# Patient Record
Sex: FEMALE | Race: WHITE | Employment: FULL TIME | URBAN - METROPOLITAN AREA
[De-identification: names, ages, dates, MRNs, and addresses within clinical notes are randomized per-mention and may not be internally consistent; named-entity substitution may affect disease eponyms.]

---

## 2018-02-13 PROBLEM — M54.42 CHRONIC BILATERAL LOW BACK PAIN WITH BILATERAL SCIATICA: Status: ACTIVE | Noted: 2018-02-13

## 2018-02-13 PROBLEM — M54.41 CHRONIC BILATERAL LOW BACK PAIN WITH BILATERAL SCIATICA: Status: ACTIVE | Noted: 2018-02-13

## 2018-02-13 PROBLEM — M54.2 NECK PAIN: Status: ACTIVE | Noted: 2018-02-13

## 2018-02-13 PROBLEM — M51.26 HNP (HERNIATED NUCLEUS PULPOSUS), LUMBAR: Status: ACTIVE | Noted: 2018-02-13

## 2018-02-13 PROBLEM — G89.29 CHRONIC BILATERAL LOW BACK PAIN WITH BILATERAL SCIATICA: Status: ACTIVE | Noted: 2018-02-13

## 2019-05-18 ENCOUNTER — APPOINTMENT (OUTPATIENT)
Dept: CT IMAGING | Age: 39
End: 2019-05-18
Attending: EMERGENCY MEDICINE
Payer: COMMERCIAL

## 2019-05-18 ENCOUNTER — HOSPITAL ENCOUNTER (EMERGENCY)
Age: 39
Discharge: HOME | End: 2019-05-18
Attending: EMERGENCY MEDICINE
Payer: COMMERCIAL

## 2019-05-18 VITALS
DIASTOLIC BLOOD PRESSURE: 79 MMHG | RESPIRATION RATE: 12 BRPM | OXYGEN SATURATION: 99 % | HEIGHT: 68 IN | WEIGHT: 204 LBS | BODY MASS INDEX: 30.92 KG/M2 | TEMPERATURE: 98 F | SYSTOLIC BLOOD PRESSURE: 124 MMHG | HEART RATE: 79 BPM

## 2019-05-18 DIAGNOSIS — R51.9 ACUTE NONINTRACTABLE HEADACHE, UNSPECIFIED HEADACHE TYPE: Primary | ICD-10-CM

## 2019-05-18 PROCEDURE — 70450 CT HEAD/BRAIN W/O DYE: CPT

## 2019-05-18 PROCEDURE — 96374 THER/PROPH/DIAG INJ IV PUSH: CPT | Performed by: EMERGENCY MEDICINE

## 2019-05-18 PROCEDURE — 96375 TX/PRO/DX INJ NEW DRUG ADDON: CPT | Performed by: EMERGENCY MEDICINE

## 2019-05-18 PROCEDURE — 99283 EMERGENCY DEPT VISIT LOW MDM: CPT | Performed by: EMERGENCY MEDICINE

## 2019-05-18 PROCEDURE — 74011250636 HC RX REV CODE- 250/636: Performed by: EMERGENCY MEDICINE

## 2019-05-18 RX ORDER — PROCHLORPERAZINE EDISYLATE 5 MG/ML
10 INJECTION INTRAMUSCULAR; INTRAVENOUS
Status: COMPLETED | OUTPATIENT
Start: 2019-05-18 | End: 2019-05-18

## 2019-05-18 RX ORDER — DIPHENHYDRAMINE HYDROCHLORIDE 50 MG/ML
25 INJECTION, SOLUTION INTRAMUSCULAR; INTRAVENOUS
Status: COMPLETED | OUTPATIENT
Start: 2019-05-18 | End: 2019-05-18

## 2019-05-18 RX ADMIN — PROCHLORPERAZINE EDISYLATE 10 MG: 5 INJECTION INTRAMUSCULAR; INTRAVENOUS at 16:17

## 2019-05-18 RX ADMIN — DIPHENHYDRAMINE HYDROCHLORIDE 25 MG: 50 INJECTION, SOLUTION INTRAMUSCULAR; INTRAVENOUS at 16:18

## 2019-05-18 RX ADMIN — SODIUM CHLORIDE 1000 ML: 900 INJECTION, SOLUTION INTRAVENOUS at 15:52

## 2019-05-18 NOTE — ED PROVIDER NOTES
Cruz Hoover is a 41 y.o. female who presents to the ED with a chief complaint of Headache. She states the headache started yesterday and has been fairly severe. The pain is diffuse in both sides of the back and front of the head. She states she does suffer from long-standing history of migraines and these are the typical spots where she gets pain. She also has a history of seizures and did have a seizure one week ago. She states she saw her PCP and has had a CT scan and EEG ordered but has not called to have those done yet. She has not had any further seizure activity. She believes a loud noise at work set it off. She does report some photophobia and nausea but no vomiting with the symptoms. Past Medical History:  
Diagnosis Date  Asthma  Chronic pain  Depression   
 and bipolar  GERD (gastroesophageal reflux disease)  Migraines   
 no aura  PCOS (polycystic ovarian syndrome)  Seizures ([address] Past Surgical History:  
Procedure Laterality Date  HX GYN    
 l/s cystectomy age 34  
 HX ORTHOPAEDIC    
 knee surgery R-2000, L-2001  HX ORTHOPAEDIC  2003  
 shoulder surgery  HX ORTHOPAEDIC  2000  
 jaw surgery Family History:  
Problem Relation Age of Onset  Thyroid Disease Mother  Other Mother   
     polymyalgia, autoimmune hepatitis  Cancer Father 72  
     melanoma  Cancer Sister 28  
     melanoma  Thyroid Disease Sister [facility] Arthritis-rheumatoid Sister  Colon Cancer Maternal Grandmother [de-identified]  
 Cancer Maternal Grandmother   
     lymphoma Social History Socioeconomic History  Marital status: SINGLE Spouse name: Not on file  Number of children: Not on file  Years of education: Not on file  Highest education level: Not on file Occupational History  Not on file Social Needs  Financial resource strain: Not on file  Food insecurity:  
  Worry: Not on file Inability: Not on file  Transportation needs:  
  Medical: Not on file Non-medical: Not on file Tobacco Use  Smoking status: Former Smoker Packs/day: 0.10 Years: 15.00 Pack years: 1.50  Smokeless tobacco: Never Used Substance and Sexual Activity  Alcohol use: No  
 Drug use: No  
 Sexual activity: Not Currently Birth control/protection: Pill Lifestyle  Physical activity:  
  Days per week: Not on file Minutes per session: Not on file  Stress: Not on file Relationships  Social connections:  
  Talks on phone: Not on file Gets together: Not on file Attends Zoroastrian service: Not on file Active member of club or organization: Not on file Attends meetings of clubs or organizations: Not on file Relationship status: Not on file  Intimate partner violence:  
  Fear of current or ex partner: Not on file Emotionally abused: Not on file Physically abused: Not on file Forced sexual activity: Not on file Other Topics Concern  Not on file Social History Narrative  Not on file ALLERGIES: Loratadine; Epinephrine; Lortab [hydrocodone-acetaminophen]; and Azithromycin Review of Systems Constitutional: Negative for chills and fever. Cardiovascular: Negative for chest pain and palpitations. Gastrointestinal: Negative for abdominal pain, diarrhea, nausea and vomiting. Musculoskeletal: Negative for neck pain and neck stiffness. Skin: Negative for color change, pallor and wound. Neurological: Negative for weakness and numbness. All other systems reviewed and are negative. Vitals:  
 05/18/19 1411 BP: 138/83 Pulse: 75 Resp: 18 Temp: 97.8 °F (36.6 °C) SpO2: 97% Weight: 92.5 kg (204 lb) Height: 5' 8\" (1.727 m) Physical Exam  
Constitutional: She is oriented to person, place, and time. She appears well-developed and well-nourished. No distress. HENT:  
Head: Normocephalic and atraumatic. Mouth/Throat: No oropharyngeal exudate. Eyes: Pupils are equal, round, and reactive to light. Conjunctivae and EOM are normal. No scleral icterus. Photophobia present. Neck: Normal range of motion. Neck supple. Cardiovascular: Normal rate, regular rhythm and normal heart sounds. Exam reveals no gallop and no friction rub. No murmur heard. Pulmonary/Chest: Effort normal. No stridor. No respiratory distress. She has no wheezes. She has no rales. She exhibits no tenderness. Abdominal: Soft. She exhibits no mass. There is no tenderness. There is no rebound and no guarding. Musculoskeletal: Normal range of motion. She exhibits no edema or tenderness. Neurological: She is alert and oriented to person, place, and time. nonfocal  
Skin: Skin is warm and dry. Capillary refill takes less than 2 seconds. She is not diaphoretic. Psychiatric: She has a normal mood and affect. Her behavior is normal.  
Nursing note and vitals reviewed. MDM Number of Diagnoses or Management Options Acute nonintractable headache, unspecified headache type:  
Diagnosis management comments: Head CT ordered because it been a long time and/seizure along with the headache. This was negative she is neurologically intact feeling better after migraine cocktail. She will follow back up with her PCP. Katelyn Quintanilla MD; 5/18/2019 @6:22 PM Voice dictation software was used during the making of this note. This software is not perfect and grammatical and other typographical errors may be present. This note has not been proofread for errors. 
=================================================================== Amount and/or Complexity of Data Reviewed Tests in the radiology section of CPT®: reviewed and ordered (Ct Head Wo Cont Result Date: 5/18/2019 CT Head without contrast   Indication: Seizure 6 days ago. Possible seizure today.  Patient currently taking antiepileptics Comparison:  None Procedure: 5 mm axial images were obtained from skull base to vertex without contrast. Radiation dose reduction techniques were used for this study:  Our CT scanners use one or all of the following: Automated exposure control, adjustment of the mA and/or kVp according to patient's size, iterative reconstruction. Findings: The ventricular size and configuration is normal given age-appropriate diffuse cortical atrophy. Negative for midline shift or mass effect. No intraparenchymal hemorrhage or extra-axial fluid collections. No acute cortical based infarct. The mastoid air cells and paranasal sinuses are clear. The calvarium is intact. IMPRESSION: No acute intracranial abnormality. ) Procedures

## 2019-05-18 NOTE — ED TRIAGE NOTES
Pt presents to the ED with HA and metallic taste in her mouth,  Reports hx of siezures,  States she had seizure last week which was first one in over 15 years,  Takes lamictal for prevention,  Concerned she may have another.

## 2019-05-18 NOTE — LETTER
[facility] EMERGENCY DEPT 
[address] [place] [id number] 
[phone] Work/School Note Date: 5/18/2019 To Whom It May concern: 
 
Hasmukh Mathew was seen and treated today in the emergency room by the following provider(s): 
Attending Provider: Reena Goodrich MD.   
 
Hasmukh Mathew may return to work on 5/21/19.  
 
Sincerely, 
 
 
 
 
Lynn Sheikh RN

## 2019-05-18 NOTE — ED NOTES
I have reviewed discharge instructions with the patient. The patient verbalized understanding. Patient left ED via Discharge Method: ambulatory to Home with family. Opportunity for questions and clarification provided. Patient given 0 scripts. To continue your aftercare when you leave the hospital, you may receive an automated call from our care team to check in on how you are doing. This is a free service and part of our promise to provide the best care and service to meet your aftercare needs.  If you have questions, or wish to unsubscribe from this service please call [phone]. Thank you for Choosing our [facility] [employer] [facility] Emergency Department.

## 2019-05-18 NOTE — LETTER
[facility] EMERGENCY DEPT 
[address] [place], [place] [id number] 
[phone] Work/School Note Date: 5/18/2019 To Whom It May concern: 
 
Rosemarie Mitchell was seen and treated today in the emergency room by the following provider(s): 
Attending Provider: Connor Rivera MD.   
 
Rosemarie Mitchell may return to work on 5/20/19.  
 
Sincerely, 
 
 
 
 
 Sindi Nj RN

## 2019-05-18 NOTE — DISCHARGE INSTRUCTIONS

## 2019-06-07 ENCOUNTER — HOSPITAL ENCOUNTER (OUTPATIENT)
Dept: NON INVASIVE DIAGNOSTICS | Age: 39
Discharge: HOME | End: 2019-06-07
Attending: NURSE PRACTITIONER
Payer: COMMERCIAL

## 2019-06-07 DIAGNOSIS — R56.9 SEIZURES (HCC): ICD-10-CM

## 2019-06-07 DIAGNOSIS — G43.511 INTRACTABLE PERSISTENT MIGRAINE AURA WITHOUT CEREBRAL INFARCTION AND WITH STATUS MIGRAINOSUS: ICD-10-CM

## 2019-06-07 PROCEDURE — 95816 EEG AWAKE AND DROWSY: CPT

## 2019-06-07 PROCEDURE — 95816 EEG AWAKE AND DROWSY: CPT | Performed by: PSYCHIATRY & NEUROLOGY

## 2019-06-07 NOTE — CONSULTS
[facility] Neurology   Routine Electroencephalogram Report      DATE:  June 7, 2019; [id number]     EEG Number:  [id number]    Indication:  Seizures    Medications:   Current Outpatient Medications   Medication Sig Dispense Refill    lamoTRIgine (LAMICTAL) 100 mg tablet       topiramate (TOPAMAX) 50 mg tablet Take 1 Tab by mouth two (2) times daily (with meals). 60 Tab 0    levonorgestrel-ethinyl estradiol (ORSYTHIA) 0.1-20 mg-mcg per tablet Take 1 Tab by mouth daily. 1 Package 0    escitalopram oxalate (LEXAPRO) 10 mg tablet Take 10 mg by mouth daily. Technique: The EEG was recorded on a 32 channel [employer] digital EEG machine. A full electrode headset was applied in accordance with the International 10-20 System of Electrode Placement. All impedances are less than 5000 Ohms. State of Consciousness: awake and drowsy       Description:  Waking and that she is fairly well organized. Minimal posterior rhythmic background activity is seen. Diffuse low-voltage fast activity and EMG artifact are noted. Intermittent eye movement artifact is present frontally. During drowsiness 30-50 µV semirhythmic 5-7 Hz slowing is seen diffusely. No focal slowing or epileptiform activity is seen    Activation Procedures:  Hyperventilation: Does not alter the record   Photic Stimulation: Symmetrical posterior entrainment        Interpretation: Normal electroencephalogram, awake, asleep and with activation procedures. There are no focal lateralizing or epileptiform features.

## 2019-06-09 PROBLEM — E66.9 OBESITY, CLASS I, BMI 30-34.9: Status: ACTIVE | Noted: 2019-06-09

## 2019-06-09 PROBLEM — G43.511 INTRACTABLE PERSISTENT MIGRAINE AURA WITHOUT CEREBRAL INFARCTION AND WITH STATUS MIGRAINOSUS: Status: ACTIVE | Noted: 2019-06-09

## 2019-07-22 PROBLEM — F33.41 RECURRENT MAJOR DEPRESSIVE DISORDER, IN PARTIAL REMISSION (HCC): Status: ACTIVE | Noted: 2019-07-22

## 2019-10-30 ENCOUNTER — HOSPITAL ENCOUNTER (OUTPATIENT)
Dept: PHYSICAL THERAPY | Age: 39
Discharge: HOME | End: 2019-10-30
Payer: COMMERCIAL

## 2019-10-30 PROCEDURE — 97165 OT EVAL LOW COMPLEX 30 MIN: CPT

## 2019-10-30 NOTE — THERAPY EVALUATION
Yanelis   : 1980  Primary: Lavell Rosas [facility]  Secondary:  [address] at [address]  [address], [address]  [phone]   [phone]       OUTPATIENT OCCUPATIONAL THERAPY:Initial Assessment 10/30/2019   ICD-10: Treatment Diagnosis: M25.641 stiffness in joint, right hand, M24.541 pain in right hand, S63.641A sprain of MCP joint of right thumb   Precautions/Allergies:   Loratadine; Epinephrine; Lortab [hydrocodone-acetaminophen]; and Azithromycin   TREATMENT PLAN:  Effective Dates: 10/30/2019 TO 2020 (90 days). Frequency/Duration: 2 times a week for 90 Day(s) MEDICAL/REFERRING DIAGNOSIS:  Sprain of metacarpophalangeal joint of right thumb, initial encounter [B30.935V]   DATE OF ONSET: 2019  REFERRING PHYSICIAN: Tim Garg MD MD Orders: evaluate and treat  Return MD Appointment: to be determined     INITIAL ASSESSMENT:  Ms. Jose E Aldana presents with sprain of MCP joint of right thumb s/p injury at work: thumb pinned in cash register drawer -59. She presents with decreased ROM of right thumb, decreased strength of right hand and pain in right hand. Following injury she was immobilized in splint for >4 weeks adding to stiffness of right hand. Her ability to perform ADL and work related tasks has been affected and she would benefit from skilled OT to return to prior level of function. PROBLEM LIST (Impacting functional limitations):  1. Decreased Strength  2. Decreased ADL/Functional Activities  3. Increased Pain  4. Decreased Flexibility/Joint Mobility INTERVENTIONS PLANNED: (Treatment may consist of any combination of the following)  1. Manual therapy training  2. Modalities  3. Therapeutic activity  4. Therapeutic exercise     GOALS: (Goals have been discussed and agreed upon with patient.)  Short-Term Functional Goals: Time Frame: 45 days  1. Decrease right hand pain by 2 grades  2.  Increase right hand strength by 7-10lbs for gripping, holding, opening containers  3. Increase right thumb ROM by 10 degrees  Discharge Goals: Time Frame: 90 days  1. Decrease right hand pain by 4 grades in order to participate in ADL and work related tasks  2. Increase right hand strength by 10-15lbs for gripping, holding, opening containers  3. Improve DASH score by 7 points from 40-59% to 20-39% impaired, limited, or restricted     OUTCOME MEASURE:   Tool Used: Tool Used: Disabilities of the Arm, Shoulder and Hand (DASH) Questionnaire - Quick Version  Score:  Initial: 35/55  Most Recent: X/55 (Date: -- )   Interpretation of Score: The DASH is designed to measure the activities of daily living in person's with upper extremity dysfunction or pain. Each section is scored on a 1-5 scale, 5 representing the greatest disability. The scores of each section are added together for a total score of 55. Score 11 12-19 20-28 29-37 38-45 46-54 55   Modifier CH CI CJ CK CL CM CN     ? Carrying, Moving, and Handling Objects:     - CURRENT STATUS: CK - 40%-59% impaired, limited or restricted    - GOAL STATUS: CJ - 20%-39% impaired, limited or restricted    - D/C STATUS:  ---------------To be determined---------------    MEDICAL NECESSITY:   · Patient is expected to demonstrate progress in strength and range of motion to increase independence with opening, gripping, carrying objects. REASON FOR SERVICES/OTHER COMMENTS:  · Patient continues to require skilled intervention due to decreased ROM/strength of right thumb/hand. Total Duration:45min  OT Patient Time In/Time Out  Time In: 0200  Time Out: 0245    Rehabilitation Potential For Stated Goals: Excellent  Regarding [facility] [employer] [facility]'s therapy, I certify that the treatment plan above will be carried out by a therapist or under their direction. Thank you for this referral,  Cody Gamble, OT     Referring Physician Signature:  Ramiro Sanford MD _______________________________ Date _____________     PAIN/SUBJECTIVE:   Initial: Pain Intensity 1: 8  Post Session:  8/10   OCCUPATIONAL PROFILE & HISTORY:   History of Injury/Illness (Reason for Referral):  Ms. Ann Camp presents with sprain of MCP joint of right thumb s/p injury at work: thumb pinned in cash register drawer 1-11-54. She presents with decreased ROM of right thumb, decreased strength of right hand and pain in right hand. Following injury she was immobilized in splint for >4 weeks adding to stiffness of right hand. Her ability to perform ADL and work related tasks has been affected and she would benefit from skilled OT to return to prior level of function. Past Medical History/Comorbidities:   Ms. Ann Camp  has a past medical history of Asthma, Chronic pain, Depression, GERD (gastroesophageal reflux disease), Migraines, PCOS (polycystic ovarian syndrome), and Seizures ([facility] [address] Ms. Ann Camp  has a past surgical history that includes hx gyn; hx orthopaedic; hx orthopaedic (2003); and hx orthopaedic (2000). Social History/Living Environment:     pt lives at home with her parents  Prior Level of Function/Work/Activity:  Works full time as a  at [employer] - since injury she is limited to 6 hour shifts, light duty   Dominant Side:         BILATERAL  Other Clinical Tests:          9 hole peg test Right hand: 31 sec Left hand: 31 second   Ambulatory/Rehab Services H2 Model Falls Risk Assessment   Risk Factors:       No Risk Factors Identified Ability to Rise from Chair:       (0)  Ability to rise in a single movement   Falls Prevention Plan:       No modifications necessary   Total: (5 or greater = High Risk): 0   ©2010 [facility] [address] [place] [address] States Patent #[id number].  Federal Law prohibits the replication, distribution or use without written permission from [facility] [employer]   Current Medications:       Current Outpatient Medications:     topiramate (TOPAMAX) 100 mg tablet, Take 1 Tab by mouth two (2) times daily (with meals). , Disp: 180 Tab, Rfl: 0    escitalopram oxalate (LEXAPRO) 10 mg tablet, Take 1 Tab by mouth daily. , Disp: 90 Tab, Rfl: 0    lamoTRIgine (LAMICTAL) 100 mg tablet, Take 1 Tab by mouth two (2) times a day., Disp: 180 Tab, Rfl: 0    levonorgestrel-ethinyl estradiol (ORSYTHIA) 0.1-20 mg-mcg per tablet, Take 1 Tab by mouth daily. , Disp: 1 Package, Rfl: 0   Date Last Reviewed:  10/31/2019   Complexity Level: Brief history (0):  LOW COMPLEXITY   ASSESSMENT OF OCCUPATIONAL PERFORMANCE:   Palpation:          No visible bruising - trace swelling / tightness at end range PROM/stiffnes of joint   ROM:          Right thumb active ROM limited by 20 degrees  Strength:          Right : 19#  Left : 40#        Right lateral pinch: 13# left lateral pinch: 18#        Left tip to tip pinch: 10# right tip to tip pinch: 15#   Physical Skills Involved:  1. Range of Motion  2. Strength  3. Pain (Chronic) Cognitive Skills Affected (resulting in the inability to perform in a timely and safe manner):   Psychosocial Skills Affected:  1.  Habits/Routines   Number of elements that affect the Plan of Care[de-identified] 1-3:  LOW COMPLEXITY   CLINICAL DECISION MAKING:   Clinical Decision-Making Assessment:     Assessment process, impact of co-morbidities, assessment modification\need for assistance, and selection of interventions: Analytical Complexity:LOW COMPLEXITY

## 2019-10-31 NOTE — PROGRESS NOTES
Babs anton  : 1980  Primary: Sneha Monique Anna  Secondary:  [address] at [facility] Therapy  [address], [address]  [phone]   [phone]      OUTPATIENT OCCUPATIONAL THERAPY: Daily Treatment Note 10/30/2019  Visit Count:  1    Pre-treatment Symptoms/Complaints:  Right thumb sprain of MCP joint, decreased ROM/strength, pain and stiffness  Pain: Initial: Pain Intensity 1: 8  Post Session:  8/10   Medications Last Reviewed:  10/31/2019  Updated Objective Findings:  See evaluation note from today   TREATMENT:     Therapeutic Activity: (    ):      Therapeutic Exercise: (  20min):  Exercises per grid below to improve strength and ROM. Required minimal visual and verbal cues to promote proper body mechanics. Progressed resistance, range and repetitions as indicated. Date:  10-30-19 Date:   Date:     Activity/Exercise Parameters Parameters Parameters   AROM right thumb 10 reps: with hand flat on table: ab/adduction, extension  Opposition/  flexion     Putty exercises Intrinsic/  extrinsic Hand strengthening                                       [facility] Portal  Treatment/Session Summary:    · Response to Treatment:  no complications. · Communication/Consultation:  None today  · Equipment provided today:  theraputty  · Recommendations/Intent for next treatment session: Next visit will focus on increasing right hand/thumb ROM/strength/fucntional use.     Total Treatment Billable Duration: eval charge only  OT Patient Time In/Time Out  Time In: 0200  Time Out: [facility], OT    Future Appointments   Date Time Provider Hussain Martinez   2019 10:00 AM Chase Tai OT [facility]   2019 10:00 AM Louisa Squires OT [facility]   2019  9:00 AM Chase Tai OT [facility]   2019  1:00 PM Chase Tai OT [facility]   11/15/2019 10:00 AM Chase Tai OT [facility]

## 2019-11-01 ENCOUNTER — HOSPITAL ENCOUNTER (OUTPATIENT)
Dept: PHYSICAL THERAPY | Age: 39
Discharge: HOME | End: 2019-11-01
Payer: COMMERCIAL

## 2019-11-01 PROCEDURE — 97110 THERAPEUTIC EXERCISES: CPT

## 2019-11-01 PROCEDURE — 97140 MANUAL THERAPY 1/> REGIONS: CPT

## 2019-11-01 NOTE — PROGRESS NOTES
Vic Ally  : 1980  Primary: Evernclillian Montalvo [facility]  Secondary:  [address] at [facility] Therapy  [address], [address]  [phone]   [phone]      OUTPATIENT OCCUPATIONAL THERAPY: Daily Treatment Note 2019  Visit Count:  2    Pre-treatment Symptoms/Complaints:  Right thumb sprain of MCP joint, decreased ROM/strength, pain and stiffness  Pain: Initial: Pain Intensity 1: 8  Post Session:  8/10   Medications Last Reviewed:  2019  Updated Objective Findings:  None Today   TREATMENT:     Therapeutic Activity: (    ):      Therapeutic Exercise: (  45min):  Exercises per grid below to improve strength and ROM. Required minimal visual and verbal cues to promote proper body mechanics. Progressed resistance, range and repetitions as indicated. MHP x10min after exercise. Date:  10-30-19 Date:  11-1-10 Date:     Activity/Exercise Parameters Parameters Parameters   AROM right thumb 10 reps: with hand flat on table: ab/adduction, extension  Opposition/  flexion 10 reps: with hand flat on table ab/adduction    Putty exercises Intrinsic/  extrinsic Hand strengthening Intrinsic/extrinsic hand strengthening    wrisiticiser  10 reps each with blue blands    Graded clothespins  Yellow, red, green                          [employer] Portal  Treatment/Session Summary:    · Response to Treatment:  no complications. Pt is compliant with HEP to date. · Communication/Consultation:  None today  · Equipment provided today:  None today  · Recommendations/Intent for next treatment session: Next visit will focus on increasing right hand/thumb ROM/strength/fucntional use.     Total Treatment Billable Duration: 60min  OT Patient Time In/Time Out  Time In: 1000  Time Out: [address], OT    Future Appointments   Date Time Provider Hussain Martinez   2019 10:00 AM RONNIE Hernandez [facility]   2019  9:00 AM R[facility]NIE Pino [facility]   11/11/2019  1:00 PM R[facility]IE Casper [facility]   11/15/2019 10:00 AM R[facility]IE Casper [facility]

## 2019-11-06 ENCOUNTER — HOSPITAL ENCOUNTER (OUTPATIENT)
Dept: PHYSICAL THERAPY | Age: 39
Discharge: HOME | End: 2019-11-06
Payer: COMMERCIAL

## 2019-11-06 PROCEDURE — 97110 THERAPEUTIC EXERCISES: CPT

## 2019-11-06 NOTE — PROGRESS NOTES
Victor Manuel Fischer  : 1980  Primary: Adriana Aj [facility]  Secondary:  [address] at [facility] Therapy  [address], [address]  [phone]   [phone]      OUTPATIENT OCCUPATIONAL THERAPY: Daily Treatment Note 2019  Visit Count:  3    Pre-treatment Symptoms/Complaints:  Right thumb sprain of MCP joint, decreased ROM/strength, pain and stiffness  Pain: Initial: Pain Intensity 1: 7  Post Session:  7/10   Medications Last Reviewed:  2019  Updated Objective Findings:  None Today   TREATMENT:     Therapeutic Activity: (    ):      Therapeutic Exercise: (  60min):  Exercises per grid below to improve strength and ROM. Required minimal visual and verbal cues to promote proper body mechanics. Progressed resistance, range and repetitions as indicated. MHP prior to exercise. Date:  10-30-19 Date:  11-1-10 Date:  19      Activity/Exercise Parameters Parameters Parameters      AROM right thumb 10 reps: with hand flat on table: ab/adduction, extension  Opposition/  flexion 10 reps: with hand flat on table ab/adduction 12 reps: with hand flat on table ab/adduction      Putty exercises Intrinsic/  extrinsic Hand strengthening Intrinsic/extrinsic hand strengthening Intrinsic/extrinsic hand strengthening      wrisiticiser  10 reps each with blue blands       Graded clothespins  Yellow, red, green Yellow, red, green , blue and one black      Velcro Board   Cylindrical grasp x 10 reps forward and backward  Key pinch x 8 reps forward and backward      Fluidotherapy   AROM thumb and wrist in fluidotherapy x 12 minutes                   [employer] Portal  Treatment/Session Summary:    · Response to Treatment:  no complications. Pt is compliant with HEP to date.  Improved activity tolerance with therapeutic exercises; increased reps and complexity of resistive movements to isolate pinch and cylindrical grasp strength  · Communication/Consultation:  None today  · Equipment provided today:  None today  · Recommendations/Intent for next treatment session: Next visit will focus on increasing right hand/thumb ROM/strength/fucntional use.     Total Treatment Billable Duration: 60min  OT Patient Time In/Time Out  Time In: 1000  Time Out: [address], OT    Future Appointments   Date Time Provider Hussain Martinez   11/8/2019  9:00 AM RONNIE Bryant   11/11/2019  1:00 PM RONNIE Bryant   11/15/2019 10:00 AM RONNIE Bryant

## 2019-11-08 ENCOUNTER — HOSPITAL ENCOUNTER (OUTPATIENT)
Dept: PHYSICAL THERAPY | Age: 39
Discharge: HOME | End: 2019-11-08
Payer: COMMERCIAL

## 2019-11-08 PROCEDURE — 97110 THERAPEUTIC EXERCISES: CPT

## 2019-11-08 NOTE — PROGRESS NOTES
Queenie Buenrostro  : 1980  Primary: Claretahira Sue [employer]  Secondary:  [address]  at [address], [facility] [address]  [phone]   [phone]      OUTPATIENT OCCUPATIONAL THERAPY: Daily Treatment Note 2019  Visit Count:  4    Pre-treatment Symptoms/Complaints:  Pt is compliant with HEP and it is being progressed as indicated. Pain: Initial: Pain Intensity 1: 7  Post Session:  7/10   Medications Last Reviewed:  2019  Updated Objective Findings:  None Today   TREATMENT:     Therapeutic Activity: (    ):      Therapeutic Exercise: (  60min):  Exercises per grid below to improve strength and ROM. Required minimal visual and verbal cues to promote proper body mechanics. Progressed resistance, range and repetitions as indicated. MHP prior to exercise.       Date:  10-30-19 Date:  11-1-10 Date:  19 Date:  19     Activity/Exercise Parameters Parameters Parameters      AROM right thumb 10 reps: with hand flat on table: ab/adduction, extension  Opposition/  flexion 10 reps: with hand flat on table ab/adduction 12 reps: with hand flat on table ab/adduction 15 reps with hand flat on table ab/adduction     Putty exercises Intrinsic/  extrinsic Hand strengthening Intrinsic/extrinsic hand strengthening Intrinsic/extrinsic hand strengthening      wrisiticiser  10 reps each with blue blands  12 reps with blue bands for pinch strengthening     Graded clothespins  Yellow, red, green Yellow, red, green , blue and one black Yellow, red , green, blue and 2 black     Velcro Board   Cylindrical grasp x 10 reps forward and backward  Key pinch x 8 reps forward and backward Cylindrical [id number] reps forward and backward   Key pinch x10 reps forward and backward     Fluidotherapy   AROM thumb and wrist in fluidotherapy x 12 minutes AROM thumb and wrist in fluidotherapy x12min     Finger spread/thumb spread    10 reps with medium blue rubberband [employer] Portal  Treatment/Session Summary:    · Response to Treatment:  no complications. Pt is compliant with HEP to date. Improved activity tolerance with therapeutic exercises; increased reps and complexity of resistive movements to isolate pinch and cylindrical grasp strength. No new complaints   · Communication/Consultation:  None today  · Equipment provided today:  Pt to add finger spread/thumb spread with medium blue rubberband to HEP  · Recommendations/Intent for next treatment session: Next visit will focus on increasing right hand/thumb ROM/strength/fucntional use.     Total Treatment Billable Duration: 60min  OT Patient Time In/Time Out  Time In: 0900  Time Out: Chris Garcia OT    Future Appointments   Date Time Provider Hussain Martinez   11/11/2019  1:00 PM Carolyn Mc   11/15/2019 10:00 AM Jasper Izaguirre OT [facility]

## 2019-11-11 ENCOUNTER — HOSPITAL ENCOUNTER (OUTPATIENT)
Dept: PHYSICAL THERAPY | Age: 39
Discharge: HOME | End: 2019-11-11
Payer: COMMERCIAL

## 2019-11-11 PROCEDURE — 97110 THERAPEUTIC EXERCISES: CPT

## 2019-11-11 NOTE — PROGRESS NOTES
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 Advised pt to try to use left hand for this task to avoid aggravating right thumb. Pain: Initial: Pain Intensity 1: 7  Post Session:  7/10   Medications Last Reviewed:  2019  Updated Objective Findings:  None Today   TREATMENT:     Therapeutic Activity: (    ):      Therapeutic Exercise: (  60min):  Exercises per grid below to improve strength and ROM. Required minimal visual and verbal cues to promote proper body mechanics. Progressed resistance, range and repetitions as indicated. MHP prior to exercise.       Date:  10-30-19 Date:  11-1-10 Date:  19 Date:  19 Date:  19    Activity/Exercise Parameters Parameters Parameters      AROM right thumb 10 reps: with hand flat on table: ab/adduction, extension  Opposition/  flexion 10 reps: with hand flat on table ab/adduction 12 reps: with hand flat on table ab/adduction 15 reps with hand flat on table ab/adduction     Putty exercises Intrinsic/  extrinsic Hand strengthening Intrinsic/extrinsic hand strengthening Intrinsic/extrinsic hand strengthening      wrisiticiser  10 reps each with blue blands  12 reps with blue bands for pinch strengthening 15 reps with blue bands for pinch and overall grasp    Graded clothespins  Yellow, red, green Yellow, red, green , blue and one black Yellow, red , green, blue and 2 black Yellow, red, green, blue and 3 black    Velcro Board   Cylindrical grasp x 10 reps forward and backward  Key pinch x 8 reps forward and backward Cylindrical [id number] reps forward and backward   Key pinch x10 reps forward and backward Cylindrical grasp x12 reps forward and backward  Key pinch x12 reps forward and backward     Fluidotherapy   AROM thumb and wrist in fluidotherapy x 12 minutes AROM thumb and wrist in fluidotherapy x12min AROM thumb and wrist in fluidotherapy x12min    Hand gripper     10 reps with medium blue rubberband 2 sets 10 reps with 2 red med rubberbands          [employer] Portal  Treatment/Session Summary:    · Response to Treatment:  no complications. Pt is compliant with HEP to date. Improved activity tolerance with therapeutic exercises; increased reps and complexity of resistive movements to isolate pinch and cylindrical grasp strength. Pt feels hand is getting stronger. Will update strength measurements at next session. · Communication/Consultation:  None today  · Equipment provided today:  Hand gripper  · Recommendations/Intent for next treatment session: Next visit will focus on increasing right hand/thumb ROM/strength/fucntional use.     Total Treatment Billable Duration: 60min  OT Patient Time In/Time Out  Time In: 0100  Time Out: 244 [facility],     Future Appointments   Date Time Provider Hussain Martinez   11/15/2019 10:00 AM Belgica Day OT [facility]   11/18/2019 11:00 AM Belgica Day OT [facility]   11/21/2019  2:00 PM Belgica Day OT [facility]   11/25/2019 11:00 AM Belgica Day OT [facility]

## 2019-11-15 ENCOUNTER — HOSPITAL ENCOUNTER (OUTPATIENT)
Dept: PHYSICAL THERAPY | Age: 39
Discharge: HOME | End: 2019-11-15
Payer: COMMERCIAL

## 2019-11-15 PROCEDURE — 97110 THERAPEUTIC EXERCISES: CPT

## 2019-11-15 NOTE — PROGRESS NOTES
Dario Donald  : 1980  Primary: Lyla Beltran [employer]  Secondary:  [address] at [facility]  [address], [facility], [address]  [phone]   [phone]      OUTPATIENT OCCUPATIONAL THERAPY: Daily Treatment Note 11/15/2019  Visit Count:  6    Pre-treatment Symptoms/Complaints:  Pain is significantly decreased today. Pain has decreased 4 grades from 8 to 4! Strength has also improved. Right  strength has increased by 16# from 19# to 35#, right lateral pinch increased by 2# from 13# to 15# and fingertip pinch increased by 4# from 10# to 14#. Pain: Initial: Pain Intensity 1: 4  Post Session:  4/10   Medications Last Reviewed:  11/15/2019  Updated Objective Findings:  right : 35#, right lateral pinch: 15#, right tip pinch:14#   TREATMENT:     Therapeutic Activity: (    ):      Therapeutic Exercise: (  60min):  Exercises per grid below to improve strength and ROM. Required minimal visual and verbal cues to promote proper body mechanics. Progressed resistance, range and repetitions as indicated. MHP prior to exercise.       Date:  10-30-19 Date:  11-1-10 Date:  19 Date:  19 Date:  19 Date:  11.15.19   Activity/Exercise Parameters Parameters Parameters      AROM right thumb 10 reps: with hand flat on table: ab/adduction, extension  Opposition/  flexion 10 reps: with hand flat on table ab/adduction 12 reps: with hand flat on table ab/adduction 15 reps with hand flat on table ab/adduction     Putty exercises Intrinsic/  extrinsic Hand strengthening Intrinsic/extrinsic hand strengthening Intrinsic/extrinsic hand strengthening   Intrinsic  /extrensic hand strengthening   wrisiticiser  10 reps each with blue blands  12 reps with blue bands for pinch strengthening 15 reps with blue bands for pinch and overall grasp 15 reps with green bands for pinch/graps   Graded clothespins  Yellow, red, green Yellow, red, green , blue and one black Yellow, red , green, blue and 2 black Yellow, red, green, blue and 3 black All colors x3   Velcro Board   Cylindrical grasp x 10 reps forward and backward  Key pinch x 8 reps forward and backward Cylindrical [id number] reps forward and backward   Key pinch x10 reps forward and backward Cylindrical grasp x12 reps forward and backward  Key pinch x12 reps forward and backward  Cylindrical grasp x12 reps  Forward and backward  Key [id number] reps forward and backward   Fluidotherapy   AROM thumb and wrist in fluidotherapy x 12 minutes AROM thumb and wrist in fluidotherapy x12min AROM thumb and wrist in fluidotherapy x12min AROM thumb and wrist in fluidotherapy x12min   Hand gripper     10 reps with medium blue rubberband 2 sets 10 reps with 2 red med rubberbands          [employer] Portal  Treatment/Session Summary:    · Response to Treatment:  no complications. Pt is making excellent progress towards goals. She is using her right hand with greater ease and less pain at work. · Communication/Consultation:  None today  · Equipment provided today:  None today  · Recommendations/Intent for next treatment session: Next visit will focus on increasing right hand/thumb ROM/strength/fucntional use.     Total Treatment Billable Duration: 60min  OT Patient Time In/Time Out  Time In: 1000  Time Out: [address], OT    Future Appointments   Date Time Provider Hussain Martinez   11/18/2019 11:00 AM [facility], OT [facility]   11/21/2019  2:00 PM [facility], OT [facility]   11/25/2019 11:00 AM [facility], OT [facility]   12/2/2019  2:00 PM [facility], OT [facility]   12/4/2019 10:00 AM [facility], OT [facility]   12/9/2019 10:00 AM [facility], OT [facility]

## 2019-11-18 ENCOUNTER — HOSPITAL ENCOUNTER (OUTPATIENT)
Dept: PHYSICAL THERAPY | Age: 39
Discharge: HOME | End: 2019-11-18
Payer: COMMERCIAL

## 2019-11-18 PROCEDURE — 97110 THERAPEUTIC EXERCISES: CPT

## 2019-11-18 NOTE — PROGRESS NOTES
Shawna Frankie  : 1980  Primary: Aisha Porras [employer]  Secondary:  [address] at [facility]  [address], [address]  [phone]   [phone]      OUTPATIENT OCCUPATIONAL THERAPY: Daily Treatment Note 2019  Visit Count:  7    Pre-treatment Symptoms/Complaints:  No new complaints   Pain: Initial: Pain Intensity 1: 3  Post Session:  3/10   Medications Last Reviewed:  2019  Updated Objective Findings:  right : 35#, right lateral pinch: 15#, right tip pinch:14#from 19   TREATMENT:     Therapeutic Activity: (    ):      Therapeutic Exercise: (  60min):  Exercises per grid below to improve strength and ROM. Required minimal visual and verbal cues to promote proper body mechanics. Progressed resistance, range and repetitions as indicated. MHP prior to exercise.       Date:  10-30-19 Date:  11-1-10 Date:  19 Date:  19 Date:  19 Date:  11.15.19   Activity/Exercise Parameters Parameters Parameters      AROM right thumb 10 reps: with hand flat on table: ab/adduction, extension  Opposition/  flexion 10 reps: with hand flat on table ab/adduction 12 reps: with hand flat on table ab/adduction 15 reps with hand flat on table ab/adduction     Putty exercises Intrinsic/  extrinsic Hand strengthening Intrinsic/extrinsic hand strengthening Intrinsic/extrinsic hand strengthening   Intrinsic  /extrensic hand strengthening   wrisiticiser  10 reps each with blue blands  12 reps with blue bands for pinch strengthening 15 reps with blue bands for pinch and overall grasp 15 reps with green bands for pinch/graps   Graded clothespins  Yellow, red, green Yellow, red, green , blue and one black Yellow, red , green, blue and 2 black Yellow, red, green, blue and 3 black All colors x3   Velcro Board   Cylindrical grasp x 10 reps forward and backward  Key pinch x 8 reps forward and backward Cylindrical [id number] reps forward and backward   Key pinch x10 reps forward and backward Cylindrical grasp x12 reps forward and backward  Key pinch x12 reps forward and backward  Cylindrical grasp x12 reps  Forward and backward  Key [id number] reps forward and backward   Fluidotherapy   AROM thumb and wrist in fluidotherapy x 12 minutes AROM thumb and wrist in fluidotherapy x12min AROM thumb and wrist in fluidotherapy x12min AROM thumb and wrist in fluidotherapy x12min   Hand gripper     10 reps with medium blue rubberband 2 sets 10 reps with 2 red med rubberbands       Date:  11/18/19 Date:   Date:     Activity/Exercise Parameters Parameters Parameters   UE ergometer 7min with moderate resistance     Active wrist exercises 3lb weight 10 reps in all planes     Graded clothespins All colors x3     Velcro board Cylindrical grasp and key pinch x10     wristiciser  2 set 10 reps for pinch/grasp     Fluidotherapy Active ROM wrist/thumb x12min                 [employer] Portal  Treatment/Session Summary:    · Response to Treatment:  no complications. Pt is making excellent progress towards goals. She is using her right hand with greater ease and less pain at work. Pain 3/10 today which is the least amount of pain she has had since injury. · Communication/Consultation:  None today  · Equipment provided today:  None today  · Recommendations/Intent for next treatment session: Next visit will focus on increasing right hand/thumb ROM/strength/functional use.     Total Treatment Billable Duration: 60min  OT Patient Time In/Time Out  Time In: 1100  Time Out: [facility], OT    Future Appointments   Date Time Provider Hussain Martinez   11/21/2019  2:00 PM Carolyn Vidales [facility]   11/25/2019 11:00 AM Agueda Gutierrez OT [facility]   12/2/2019  2:00 PM Agueda Gutierrez OT [facility]   12/4/2019 10:00 AM Agueda Gutierrez OT [facility]   12/9/2019 10:00 AM Agueda Gutierrez OT [facility]

## 2019-11-21 ENCOUNTER — HOSPITAL ENCOUNTER (OUTPATIENT)
Dept: PHYSICAL THERAPY | Age: 39
Discharge: HOME | End: 2019-11-21
Payer: COMMERCIAL

## 2019-11-21 PROCEDURE — 97110 THERAPEUTIC EXERCISES: CPT

## 2019-11-21 NOTE — PROGRESS NOTES
Susy Motley  : 1980  Primary: Susanna Query [employer]  Secondary:  [address] at [facility]  [address], [address]  [phone]   [phone]      OUTPATIENT OCCUPATIONAL THERAPY: Daily Treatment Note 2019  Visit Count:  8    Pre-treatment Symptoms/Complaints:  Pt is happy with progress. Pain: Initial: Pain Intensity 1: 3  Post Session:  3/10   Medications Last Reviewed:  2019  Updated Objective Findings:  right : 35#, right lateral pinch: 15#, right tip pinch:14#from 19   TREATMENT:     Therapeutic Activity: (    ):      Therapeutic Exercise: (  60min):  Exercises per grid below to improve strength and ROM. Required minimal visual and verbal cues to promote proper body mechanics. Progressed resistance, range and repetitions as indicated. MHP prior to exercise.       Date:  10-30-19 Date:  11-1-10 Date:  19 Date:  19 Date:  19 Date:  11.15.19   Activity/Exercise Parameters Parameters Parameters      AROM right thumb 10 reps: with hand flat on table: ab/adduction, extension  Opposition/  flexion 10 reps: with hand flat on table ab/adduction 12 reps: with hand flat on table ab/adduction 15 reps with hand flat on table ab/adduction     Putty exercises Intrinsic/  extrinsic Hand strengthening Intrinsic/extrinsic hand strengthening Intrinsic/extrinsic hand strengthening   Intrinsic  /extrensic hand strengthening   wrisiticiser  10 reps each with blue blands  12 reps with blue bands for pinch strengthening 15 reps with blue bands for pinch and overall grasp 15 reps with green bands for pinch/graps   Graded clothespins  Yellow, red, green Yellow, red, green , blue and one black Yellow, red , green, blue and 2 black Yellow, red, green, blue and 3 black All colors x3   Velcro Board   Cylindrical grasp x 10 reps forward and backward  Key pinch x 8 reps forward and backward Cylindrical [id number] reps forward and backward   Key pinch x10 reps forward and backward Cylindrical grasp x12 reps forward and backward  Key pinch x12 reps forward and backward  Cylindrical grasp x12 reps  Forward and backward  Key [id number] reps forward and backward   Fluidotherapy   AROM thumb and wrist in fluidotherapy x 12 minutes AROM thumb and wrist in fluidotherapy x12min AROM thumb and wrist in fluidotherapy x12min AROM thumb and wrist in fluidotherapy x12min   Hand gripper     10 reps with medium blue rubberband 2 sets 10 reps with 2 red med rubberbands       Date:  11/18/19 Date:  11/21/19 Date:     Activity/Exercise Parameters Parameters Parameters   UE ergometer 7min with moderate resistance     Active wrist exercises 3lb weight 10 reps in all planes 3lb weight 12 reps in all planes    Graded clothespins All colors x3 All colors x3    Velcro board Cylindrical grasp and key pinch x10     wristiciser  2 set 10 reps for pinch/grasp     Fluidotherapy Active ROM wrist/thumb x12min Active ROM wrist/thumb x12min    Theraputty  instrinsic/extrensic hand   strengthening     Hand gripper   2 sets 20 reps with 2 medium rubberbands          [employer] Portal  Treatment/Session Summary:    · Response to Treatment:  no complications. Pt is making excellent progress towards goals. She is using her right hand with greater ease and less pain at work. She is progressing towards discharge   · Communication/Consultation:  None today  · Equipment provided today:  None today  · Recommendations/Intent for next treatment session: Next visit will focus on increasing right hand/thumb ROM/strength/functional use.     Total Treatment Billable Duration: 60min  OT Patient Time In/Time Out  Time In: 0200  Time Out: 0300  Shannon Heart OT    Future Appointments   Date Time Provider Hussain Martinez   11/25/2019 11:00 AM Carolyn Bryant   12/2/2019  2:00 PM RONNIE Bryant   [facility]2019 [facility] 12/9/2019 10:00 AM Katelin Green OT [facility]

## 2019-11-25 ENCOUNTER — HOSPITAL ENCOUNTER (OUTPATIENT)
Dept: PHYSICAL THERAPY | Age: 39
Discharge: HOME | End: 2019-11-25
Payer: COMMERCIAL

## 2019-11-25 PROCEDURE — 97110 THERAPEUTIC EXERCISES: CPT

## 2019-11-25 NOTE — PROGRESS NOTES
Genevive Double  : 1980  Primary: Leonor Castillo  Secondary:  [address]  at [address]  [address] [place] [address], [address]  [phone]   [phone]      OUTPATIENT OCCUPATIONAL THERAPY: Daily Treatment Note 2019  Visit Count:  9    Pre-treatment Symptoms/Complaints:  Pt will only be seen 1x this week due to holiday schedule. No new complaints today. Pain: Initial: Pain Intensity 1: 2  Post Session:  2/10   Medications Last Reviewed:  2019  Updated Objective Findings:  right : 35#, right lateral pinch: 15#, right tip pinch:14#from 19   TREATMENT:     Therapeutic Activity: (    ):      Therapeutic Exercise: (  60min):  Exercises per grid below to improve strength and ROM. Required minimal visual and verbal cues to promote proper body mechanics. Progressed resistance, range and repetitions as indicated. MHP prior to exercise.       Date:  10-30-19 Date:  11-1-10 Date:  19 Date:  19 Date:  19 Date:  11.15.19   Activity/Exercise Parameters Parameters Parameters      AROM right thumb 10 reps: with hand flat on table: ab/adduction, extension  Opposition/  flexion 10 reps: with hand flat on table ab/adduction 12 reps: with hand flat on table ab/adduction 15 reps with hand flat on table ab/adduction     Putty exercises Intrinsic/  extrinsic Hand strengthening Intrinsic/extrinsic hand strengthening Intrinsic/extrinsic hand strengthening   Intrinsic  /extrensic hand strengthening   wrisiticiser  10 reps each with blue blands  12 reps with blue bands for pinch strengthening 15 reps with blue bands for pinch and overall grasp 15 reps with green bands for pinch/graps   Graded clothespins  Yellow, red, green Yellow, red, green , blue and one black Yellow, red , green, blue and 2 black Yellow, red, green, blue and 3 black All colors x3   Velcro Board   Cylindrical grasp x 10 reps forward and backward  Key pinch x 8 reps forward and backward Cylindrical [id number] reps forward and backward   Key pinch x10 reps forward and backward Cylindrical grasp x12 reps forward and backward  Key pinch x12 reps forward and backward  Cylindrical grasp x12 reps  Forward and backward  Key [id number] reps forward and backward   Fluidotherapy   AROM thumb and wrist in fluidotherapy x 12 minutes AROM thumb and wrist in fluidotherapy x12min AROM thumb and wrist in fluidotherapy x12min AROM thumb and wrist in fluidotherapy x12min   Hand gripper     10 reps with medium blue rubberband 2 sets 10 reps with 2 red med rubberbands       Date:  11/18/19 Date:  11/21/19 Date:  11/25/19   Activity/Exercise Parameters Parameters Parameters   UE ergometer 7min with moderate resistance  7min with moderate resistance    Active wrist exercises 3lb weight 10 reps in all planes 3lb weight 12 reps in all planes 3lb weight 15reps in all planes   Graded clothespins All colors x3 All colors x3 All colors x4   Velcro board Cylindrical grasp and key pinch x10  Cylindrical and key pinch x12   wristiciser  2 set 10 reps for pinch/grasp  2 sets 15 reps for pinch/grasp with black bands   Fluidotherapy Active ROM wrist/thumb x12min Active ROM wrist/thumb x12min    Theraputty  instrinsic/extrensic hand   strengthening  Intrinsic/extrinsic hand strengthening    Hand gripper   2 sets 20 reps with 2 medium rubberbands 2 sets 25 reps with 2  Medium rubberbands         Med[employer] Portal  Treatment/Session Summary:    · Response to Treatment:  no complications. Pt is making excellent progress towards goals. She is using her right hand with greater ease and less pain at work. She is progressing towards discharge. Work tasks are getting easier for her with less pain noted after work shift.    · Communication/Consultation:  None today  · Equipment provided today:  None today  · Recommendations/Intent for next treatment session: Next visit will focus on increasing right hand/thumb ROM/strength/functional use.    Total Treatment Billable Duration: 60min  OT Patient Time In/Time Out  Time In: 1100  Time Out: [address], OT    Future Appointments   Date Time Provider Hussain Martinez   12/2/2019  2:00 PM Thana Schaumann, [place] [facility]   12/4/2019 10:00 AM Thana Schaumann, OT [facility]   12/9/2019 10:00 AM [facility]umann[facility]

## 2019-12-04 ENCOUNTER — HOSPITAL ENCOUNTER (OUTPATIENT)
Dept: PHYSICAL THERAPY | Age: 39
Discharge: HOME | End: 2019-12-04
Payer: COMMERCIAL

## 2019-12-04 PROCEDURE — 97110 THERAPEUTIC EXERCISES: CPT

## 2019-12-04 NOTE — PROGRESS NOTES
Leann Vasquez  : 1980  Primary: Chuyita Rooney [facility]  Secondary:  [address] at [address]  [address], [address]  [phone]   [phone]       OUTPATIENT OCCUPATIONAL THERAPY:Progress Report 2019   ICD-10: Treatment Diagnosis: M25.641 stiffness in joint, right hand, M24.541 pain in right hand, S63.641A sprain of MCP joint of right thumb   Precautions/Allergies:   Loratadine; Epinephrine; Lortab [hydrocodone-acetaminophen]; and Azithromycin   TREATMENT PLAN:  Effective Dates: 10/30/2019 TO 2020 (90 days). Frequency/Duration: 2 times a week for 90 Day(s) MEDICAL/REFERRING DIAGNOSIS:  Sprain of metacarpophalangeal joint of right thumb, initial encounter [R36.313S]   DATE OF ONSET: 2019  REFERRING PHYSICIAN: Nima Araya MD MD Orders: evaluate and treat  Return MD Appointment: to be determined     INITIAL ASSESSMENT:  Ms. Chana Diaz presents with sprain of MCP joint of right thumb s/p injury at work: thumb pinned in cash register drawer 40. She presents with decreased ROM of right thumb, decreased strength of right hand and pain in right hand. Following injury she was immobilized in splint for >4 weeks adding to stiffness of right hand. Her ability to perform ADL and work related tasks has been affected and she would benefit from skilled OT to return to prior level of function. PROBLEM LIST (Impacting functional limitations):  1. Decreased Strength  2. Decreased ADL/Functional Activities  3. Increased Pain  4. Decreased Flexibility/Joint Mobility INTERVENTIONS PLANNED: (Treatment may consist of any combination of the following)  1. Manual therapy training  2. Modalities  3. Therapeutic activity  4. Therapeutic exercise     GOALS: (Goals have been discussed and agreed upon with patient.)  Short-Term Functional Goals: Time Frame: 45 days  1. Decrease right hand pain by 2 grades goal met  2.  Increase right hand strength by 7-10lbs for gripping, holding, opening containers goal met  3. Increase right thumb ROM by 10 degrees goal met  Discharge Goals: Time Frame: 90 days  1. Decrease right hand pain by 4 grades in order to participate in ADL and work related tasks goal met progress to decrease pain by 7 grads  2. Increase right hand strength by 10-15lbs for gripping, holding, opening containers goal met progress to 20lbs  3. Improve DASH score by 7 points from 40-59% to 20-39% impaired, limited, or restricted goal met progress to 10lbs     OUTCOME MEASURE:   Tool Used: Tool Used: Disabilities of the Arm, Shoulder and Hand (DASH) Questionnaire - Quick Version  Score:  Initial: 35/55  Most Recent: 16/55 (Date: 12-4-19)   Interpretation of Score: The DASH is designed to measure the activities of daily living in person's with upper extremity dysfunction or pain. Each section is scored on a 1-5 scale, 5 representing the greatest disability. The scores of each section are added together for a total score of 55. Score 11 12-19 20-28 29-37 38-45 46-54 55   Modifier CH CI CJ CK CL CM CN     ? Carrying, Moving, and Handling Objects:     - CURRENT STATUS: CI - 1%-19% impaired, limited or restricted    - GOAL STATUS: CJ - 20%-39% impaired, limited or restricted    - D/C STATUS:  ---------------To be determined---------------    MEDICAL NECESSITY:   · Patient is expected to demonstrate progress in strength and range of motion to increase independence with opening, gripping, carrying objects. REASON FOR SERVICES/OTHER COMMENTS:  · Patient continues to require skilled intervention due to decreased ROM/strength of right thumb/hand. Total Duration:60min  OT Patient Time In/Time Out  Time In: 1000  Time Out: 1100    Rehabilitation Potential For Stated Goals: Excellent  Regarding Zara Caballero's therapy, I certify that the treatment plan above will be carried out by a therapist or under their direction.   Thank you for this referral,  Andrés San OT     Referring Physician Signature: Bill Atkinson MD _______________________________ Date _____________     PAIN/SUBJECTIVE:   Initial: Pain Intensity 1: 2  Post Session:  8/10   OCCUPATIONAL PROFILE & HISTORY:   History of Injury/Illness (Reason for Referral):  Ms. Iram Jacksno presents with sprain of MCP joint of right thumb s/p injury at work: thumb pinned in cash register drawer 8-05-56. She presents with decreased ROM of right thumb, decreased strength of right hand and pain in right hand. Following injury she was immobilized in splint for >4 weeks adding to stiffness of right hand. Her ability to perform ADL and work related tasks has been affected and she would benefit from skilled OT to return to prior level of function. Past Medical History/Comorbidities:   Ms. Iram Jackson  has a past medical history of Asthma, Chronic pain, Depression, GERD (gastroesophageal reflux disease), Migraines, PCOS (polycystic ovarian syndrome), and Seizures ([facility] [address] Ms. Iram Jackson  has a past surgical history that includes hx gyn; hx orthopaedic; hx orthopaedic (2003); and hx orthopaedic (2000). Social History/Living Environment:     pt lives at home with her parents  Prior Level of Function/Work/Activity:  Works full time as a  at [employer] - since injury she is limited to 6 hour shifts, light duty   Dominant Side:         BILATERAL  Other Clinical Tests:          9 hole peg test Right hand: 31 sec Left hand: 31 second   Ambulatory/Rehab Services H2 Model Falls Risk Assessment   Risk Factors:       No Risk Factors Identified Ability to Rise from Chair:       (0)  Ability to rise in a single movement   Falls Prevention Plan:       No modifications necessary   Total: (5 or greater = High Risk): 0   ©2010 [facility] [address] States Patent #[id number].  Federal Law prohibits the replication, distribution or use without written permission from [facility]   Current Medications:       Current Outpatient Medications:     topiramate (TOPAMAX) 100 mg tablet, Take 1 Tab by mouth two (2) times daily (with meals). , Disp: 180 Tab, Rfl: 0    escitalopram oxalate (LEXAPRO) 10 mg tablet, Take 1 Tab by mouth daily. , Disp: 90 Tab, Rfl: 0    lamoTRIgine (LAMICTAL) 100 mg tablet, Take 1 Tab by mouth two (2) times a day., Disp: 180 Tab, Rfl: 0    levonorgestrel-ethinyl estradiol (ORSYTHIA) 0.1-20 mg-mcg per tablet, Take 1 Tab by mouth daily. , Disp: 1 Package, Rfl: 0   Date Last Reviewed:  12/4/2019   Complexity Level: Brief history (0):  LOW COMPLEXITY   ASSESSMENT OF OCCUPATIONAL PERFORMANCE:   Palpation:          normal  ROM:          Right thumb active ROM limited by 20 degrees update 12-4-19: pt has full AROM right thumb  Strength:          Right : 19#  Left : 40#  Updated 12-4-19: right : 54#        Right lateral pinch: 13# left lateral pinch: 18# UPdated 12-4-29: right: 17#        right tip to tip pinch: 10#left tip to tip pinch: 15# updated 12-4-19: right: 14#   Physical Skills Involved:  1. Range of Motion  2. Strength  3. Pain (Chronic) Cognitive Skills Affected (resulting in the inability to perform in a timely and safe manner):   Psychosocial Skills Affected:  1.  Habits/Routines   Number of elements that affect the Plan of Care[de-identified] 1-3:  LOW COMPLEXITY   CLINICAL DECISION MAKING:   Clinical Decision-Making Assessment:     Assessment process, impact of co-morbidities, assessment modification\need for assistance, and selection of interventions: Analytical Complexity:LOW COMPLEXITY

## 2019-12-04 NOTE — PROGRESS NOTES
Yanelis   : 1980  Primary: Lavell Rosas [facility]  Secondary:  [address] at [facility]  [address], [address]  [phone]   [phone]      OUTPATIENT OCCUPATIONAL THERAPY: Daily Treatment Note 2019  Visit Count:  10    Pre-treatment Symptoms/Complaints:  Pt has follow up with MD  and is hoping she will be released at that time and able to return to regular work duties. She has made excellent progress in OT and will be ready for discharge after her last 2 remaining visits are complete. Pain: Initial: Pain Intensity 1: 2  Post Session:  2/10   Medications Last Reviewed:  2019  Updated Objective Findings:  See evaluation note from todayfrom 19   TREATMENT:     Therapeutic Activity: (    ):      Therapeutic Exercise: (  60min):  Exercises per grid below to improve strength and ROM. Required minimal visual and verbal cues to promote proper body mechanics. Progressed resistance, range and repetitions as indicated. MHP prior to exercise.       Date:  10-30-19 Date:  11-1-10 Date:  19 Date:  19 Date:  19 Date:  11.15.19   Activity/Exercise Parameters Parameters Parameters      AROM right thumb 10 reps: with hand flat on table: ab/adduction, extension  Opposition/  flexion 10 reps: with hand flat on table ab/adduction 12 reps: with hand flat on table ab/adduction 15 reps with hand flat on table ab/adduction     Putty exercises Intrinsic/  extrinsic Hand strengthening Intrinsic/extrinsic hand strengthening Intrinsic/extrinsic hand strengthening   Intrinsic  /extrensic hand strengthening   wrisiticiser  10 reps each with blue blands  12 reps with blue bands for pinch strengthening 15 reps with blue bands for pinch and overall grasp 15 reps with green bands for pinch/graps   Graded clothespins  Yellow, red, green Yellow, red, green , blue and one black Yellow, red , green, blue and 2 black Yellow, red, green, blue and 3 black All colors x3   Velcro Board   Cylindrical grasp x 10 reps forward and backward  Key pinch x 8 reps forward and backward Cylindrical [id number] reps forward and backward   Key pinch x10 reps forward and backward Cylindrical grasp x12 reps forward and backward  Key pinch x12 reps forward and backward  Cylindrical grasp x12 reps  Forward and backward  Key [id number] reps forward and backward   Fluidotherapy   AROM thumb and wrist in fluidotherapy x 12 minutes AROM thumb and wrist in fluidotherapy x12min AROM thumb and wrist in fluidotherapy x12min AROM thumb and wrist in fluidotherapy x12min   Hand gripper     10 reps with medium blue rubberband 2 sets 10 reps with 2 red med rubberbands       Date:  11/18/19 Date:  11/21/19 Date:  11/25/19 Date:   12/4/19   Activity/Exercise Parameters Parameters Parameters    UE ergometer 7min with moderate resistance  7min with moderate resistance  10min with moderate resistance    Active wrist exercises 3lb weight 10 reps in all planes 3lb weight 12 reps in all planes 3lb weight 15reps in all planes 3lb weight 15 reps all planes   Graded clothespins All colors x3 All colors x3 All colors x4 All colors x4   Velcro board Cylindrical grasp and key pinch x10  Cylindrical and key pinch x12 Cylindrical and key pinch x12   wristiciser  2 set 10 reps for pinch/grasp  2 sets 15 reps for pinch/grasp with black bands 2 sets 20 reps for pinch/gross grasp with black bands   Fluidotherapy Active ROM wrist/thumb x12min Active ROM wrist/thumb x12min     Theraputty  instrinsic/extrensic hand   strengthening  Intrinsic/extrinsic hand strengthening     Hand gripper   2 sets 20 reps with 2 medium rubberbands 2 sets 25 reps with 2  Medium rubberbands    Yuriy Interiano therabar    Up/down/twist/supination/pronation x 10 rpes         [facility] Portal  Treatment/Session Summary:    · Response to Treatment:  no complications. Pt demonstrates normal active ROM for right thumb.  Pain has decreased by 6 grades from 8 to 2. She is progressing towards discharge. · Communication/Consultation:  None today  · Equipment provided today:  None today  · Recommendations/Intent for next treatment session: Next visit will focus on increasing right hand/thumb ROM/strength/functional use.  Prepare for discharge    Total Treatment Billable Duration: 60min  OT Patient Time In/Time Out  Time In: 1000  Time Out: [address], OT    Future Appointments   Date Time Provider Hussain Martinez   12/6/2019  8:00 AM RONNIE Hernandez   12/9/2019 10:00 AM Thana Schaumann, OT [facility]

## 2019-12-06 ENCOUNTER — HOSPITAL ENCOUNTER (OUTPATIENT)
Dept: PHYSICAL THERAPY | Age: 39
Discharge: HOME | End: 2019-12-06
Payer: COMMERCIAL

## 2019-12-06 PROCEDURE — 97110 THERAPEUTIC EXERCISES: CPT

## 2019-12-06 NOTE — PROGRESS NOTES
Charito Konrad  : 1980  Primary: Ricardo Pacemaricruz [facility]  Secondary:  [address] at [facility]  [address], [address]  [phone]   [phone]      OUTPATIENT OCCUPATIONAL THERAPY: Daily Treatment Note 2019  Visit Count:  11    Pre-treatment Symptoms/Complaints: \"My hand is pretty much back to normal; I am ready to go back to work\"  Pt has follow up with MD  and is hoping she will be released at that time and able to return to regular work duties. She has made excellent progress in OT and will be ready for discharge after her last 2 remaining visits are complete. Pain: Initial: Pain Intensity 1: 2  Post Session:  2/10   Medications Last Reviewed:  2019  Updated Objective Findings:  See evaluation note from todayfrom 19   TREATMENT:     Therapeutic Activity: (    ):      Therapeutic Exercise: (  60min):  Exercises per grid below to improve strength and ROM. Required minimal visual and verbal cues to promote proper body mechanics. Progressed resistance, range and repetitions as indicated. MHP prior to exercise.       Date:  10-30-19 Date:  11-1-10 Date:  19 Date:  19 Date:  19 Date:  11.15.19   Activity/Exercise Parameters Parameters Parameters      AROM right thumb 10 reps: with hand flat on table: ab/adduction, extension  Opposition/  flexion 10 reps: with hand flat on table ab/adduction 12 reps: with hand flat on table ab/adduction 15 reps with hand flat on table ab/adduction     Putty exercises Intrinsic/  extrinsic Hand strengthening Intrinsic/extrinsic hand strengthening Intrinsic/extrinsic hand strengthening   Intrinsic  /extrensic hand strengthening   wrisiticiser  10 reps each with blue blands  12 reps with blue bands for pinch strengthening 15 reps with blue bands for pinch and overall grasp 15 reps with green bands for pinch/graps   Graded clothespins  Yellow, red, green Yellow, red, green , blue and one black Yellow, red , green, blue and 2 black Yellow, red, green, blue and 3 black All colors x3   Velcro Board   Cylindrical grasp x 10 reps forward and backward  Key pinch x 8 reps forward and backward Cylindrical [id number] reps forward and backward   Key pinch x10 reps forward and backward Cylindrical grasp x12 reps forward and backward  Key pinch x12 reps forward and backward  Cylindrical grasp x12 reps  Forward and backward  Key [id number] reps forward and backward   Fluidotherapy   AROM thumb and wrist in fluidotherapy x 12 minutes AROM thumb and wrist in fluidotherapy x12min AROM thumb and wrist in fluidotherapy x12min AROM thumb and wrist in fluidotherapy x12min   Hand gripper     10 reps with medium blue rubberband 2 sets 10 reps with 2 red med rubberbands       Date:  11/18/19 Date:  11/21/19 Date:  11/25/19 Date:   12/4/19 Date:  126/19 Date:   Activity/Exercise Parameters Parameters Parameters  Parameters Parameters   UE ergometer 7min with moderate resistance  7min with moderate resistance  10min with moderate resistance  10min with moderate resistance     Active wrist exercises 3lb weight 10 reps in all planes 3lb weight 12 reps in all planes 3lb weight 15reps in all planes 3lb weight 15 reps all planes     Graded clothespins All colors x3 All colors x3 All colors x4 All colors x4 All colors x4    Velcro board Cylindrical grasp and key pinch x10  Cylindrical and key pinch x12 Cylindrical and key pinch x12     wristiciser  2 set 10 reps for pinch/grasp  2 sets 15 reps for pinch/grasp with black bands 2 sets 20 reps for pinch/gross grasp with black bands 2 sets 20 reps for pinch/gross grasp with black bands    Fluidotherapy Active ROM wrist/thumb x12min Active ROM wrist/thumb x12min   Active ROM wrist/thumb x16 min    Theraputty  instrinsic/extrensic hand   strengthening  Intrinsic/extrinsic hand strengthening   Intrinsic/extrinsic hand strengthening     Hand gripper   2 sets 20 reps with 2 medium rubberbands 2 sets 25 reps with 2  Medium rubberbands      Green therabar    Up/down/twist/supination/pronation x 10 rpes Up/down/twist/supination/pronation x 10 rpes    Velcro Board     Cylindrical grasp x15 reps  Forward and backward  Key [id number] reps forward and backward          [facility] Portal  Treatment/Session Summary:    · Response to Treatment:  no complications. Pt demonstrates normal active ROM for right thumb. Pain has decreased by 6 grades from 8 to 2. She is progressing towards discharge. Strength improved; AROM WNL  · Communication/Consultation:  None today  · Equipment provided today:  None today  · Recommendations/Intent for next treatment session: Next visit will focus on increasing right hand/thumb ROM/strength/functional use.  Prepare for discharge    Total Treatment Billable Duration: 60min  OT Patient Time In/Time Out  Time In: 0800  Time Out: 0900  Aurelia Palomo OT    Future Appointments   Date Time Provider Hussain Michelle   12/9/2019 10:00 AM Emily Solano OT [facility]

## 2019-12-09 ENCOUNTER — HOSPITAL ENCOUNTER (OUTPATIENT)
Dept: PHYSICAL THERAPY | Age: 39
Discharge: HOME | End: 2019-12-09
Payer: COMMERCIAL

## 2019-12-09 PROCEDURE — 97110 THERAPEUTIC EXERCISES: CPT

## 2019-12-09 NOTE — PROGRESS NOTES
Isidra Field  : 1980  Primary: Khai Salazar [facility]  Secondary:  [address]  at [facility]  [address], [address]  [phone]   [phone]      OUTPATIENT OCCUPATIONAL THERAPY: Daily Treatment Note 2019  Visit Count:  12    Pre-treatment Symptoms/Complaints: Pt is ready for discharge from OT. She has returned to her PLOF. Pain: Initial: Pain Intensity 1: 0  Post Session:  0/10   Medications Last Reviewed:  2019  Updated Objective Findings:  See discharge note from today   TREATMENT:     Therapeutic Activity: (    ):      Therapeutic Exercise: (  60min):  Exercises per grid below to improve strength and ROM. Required minimal visual and verbal cues to promote proper body mechanics. Progressed resistance, range and repetitions as indicated. MHP prior to exercise.       Date:  10-30-19 Date:  11-1-10 Date:  19 Date:  19 Date:  19 Date:  11.15.19   Activity/Exercise Parameters Parameters Parameters      AROM right thumb 10 reps: with hand flat on table: ab/adduction, extension  Opposition/  flexion 10 reps: with hand flat on table ab/adduction 12 reps: with hand flat on table ab/adduction 15 reps with hand flat on table ab/adduction     Putty exercises Intrinsic/  extrinsic Hand strengthening Intrinsic/extrinsic hand strengthening Intrinsic/extrinsic hand strengthening   Intrinsic  /extrensic hand strengthening   wrisiticiser  10 reps each with blue blands  12 reps with blue bands for pinch strengthening 15 reps with blue bands for pinch and overall grasp 15 reps with green bands for pinch/graps   Graded clothespins  Yellow, red, green Yellow, red, green , blue and one black Yellow, red , green, blue and 2 black Yellow, red, green, blue and 3 black All colors x3   Velcro Board   Cylindrical grasp x 10 reps forward and backward  Key pinch x 8 reps forward and backward Cylindrical [id number] reps forward and backward   Key pinch x10 reps forward and backward Cylindrical grasp x12 reps forward and backward  Key pinch x12 reps forward and backward  Cylindrical grasp x12 reps  Forward and backward  Key [id number] reps forward and backward   Fluidotherapy   AROM thumb and wrist in fluidotherapy x 12 minutes AROM thumb and wrist in fluidotherapy x12min AROM thumb and wrist in fluidotherapy x12min AROM thumb and wrist in fluidotherapy x12min   Hand gripper     10 reps with medium blue rubberband 2 sets 10 reps with 2 red med rubberbands       Date:  11/18/19 Date:  11/21/19 Date:  11/25/19 Date:   12/4/19 Date:  12/6/19 Date:  12/9/19   Activity/Exercise Parameters Parameters Parameters  Parameters Parameters   UE ergometer 7min with moderate resistance  7min with moderate resistance  10min with moderate resistance  10min with moderate resistance  10min with moderate resistance   Active wrist exercises 3lb weight 10 reps in all planes 3lb weight 12 reps in all planes 3lb weight 15reps in all planes 3lb weight 15 reps all planes  3lb weight 2 sset 15 reps all planes   Graded clothespins All colors x3 All colors x3 All colors x4 All colors x4 All colors x4 All colors x4   Velcro board Cylindrical grasp and key pinch x10  Cylindrical and key pinch x12 Cylindrical and key pinch x12     wristiciser  2 set 10 reps for pinch/grasp  2 sets 15 reps for pinch/grasp with black bands 2 sets 20 reps for pinch/gross grasp with black bands 2 sets 20 reps for pinch/gross grasp with black bands    Fluidotherapy Active ROM wrist/thumb x12min Active ROM wrist/thumb x12min   Active ROM wrist/thumb x16 min Active ROM wrist/thumb x25min   Theraputty  instrinsic/extrensic hand   strengthening  Intrinsic/extrinsic hand strengthening   Intrinsic/extrinsic hand strengthening     Hand gripper   2 sets 20 reps with 2 medium rubberbands 2 sets 25 reps with 2  Medium rubberbands      Lester Setter therabar    Up/down/twist/supination/pronation x 10 rpes Up/down/twist/supination/pronation x 10 rpes Up/down/twist/supination/pronation 2 sets  10 reps   Velcro Board     Cylindrical grasp x15 reps  Forward and backward  Key [id number] reps forward and backward          [facility] Portal  Treatment/Session Summary:    · Response to Treatment:  Right thumb/wrist ROM/strength is WNL and pain is 0. She is independent with HEP. She is ready for discharge. · Communication/Consultation:  None today  · Equipment provided today:  None today  · Recommendations/Intent for next treatment session: Discharge from OT. Total Treatment Billable Duration: 60min  OT Patient Time In/Time Out  Time In: 1005  Time Out: [address], OT    No future appointments.

## 2019-12-09 NOTE — PROGRESS NOTES
Vera Cornea : 1980 Primary: Ignacio Taylor Secondary:  Therapy Center at [address] [place] [address], [address] [place] [address] [address] [place], [address] [phone]   [phone] OUTPATIENT OCCUPATIONAL THERAPY:Progress Report and Discharge Summary 2019 ICD-10: Treatment Diagnosis: M25.641 stiffness in joint, right hand, M24.541 pain in right hand, S63.641A sprain of MCP joint of right thumb Precautions/Allergies:  
Loratadine; Epinephrine; Lortab [hydrocodone-acetaminophen]; and Azithromycin TREATMENT PLAN: 
Effective Dates: 10/30/2019 TO 2020 (90 days). Frequency/Duration: 2 times a week for 90 Day(s) discharge 19 MEDICAL/REFERRING DIAGNOSIS: 
Sprain of metacarpophalangeal joint of right thumb, initial encounter [A14.227H] DATE OF ONSET: 2019 REFERRING PHYSICIAN: Alejandra Carreno MD MD Orders: evaluate and treat Return MD Appointment: to be determined Discharge Summary:Ms. Anant Cox was seen for 12 OT sessions and met all OT goals. Pain decreased from 8 to 0. Right hand  strength improved from 19 to 45#, right lateral pinch from 13 to 17# and right thumb tip pinch from 10 to 14#. Her DASH score improved by 19 points. AROM of the right thumb is WNL and she has returned to her prior level of function. She is ready for discharge from OT. INITIAL ASSESSMENT:  Ms. Anant Cox presents with sprain of MCP joint of right thumb s/p injury at work: thumb pinned in cash register drawer [id number]. She presents with decreased ROM of right thumb, decreased strength of right hand and pain in right hand. Following injury she was immobilized in splint for >4 weeks adding to stiffness of right hand. Her ability to perform ADL and work related tasks has been affected and she would benefit from skilled OT to return to prior level of function. PROBLEM LIST (Impacting functional limitations): 1. Decreased Strength 2. Decreased ADL/Functional Activities 3. Increased Pain 4. Decreased Flexibility/Joint Mobility INTERVENTIONS PLANNED: (Treatment may consist of any combination of the following) 1. Manual therapy training 2. Modalities 3. Therapeutic activity 4. Therapeutic exercise GOALS: (Goals have been discussed and agreed upon with patient.) Short-Term Functional Goals: Time Frame: 45 days 1. Decrease right hand pain by 2 grades goal met 2. Increase right hand strength by 7-10lbs for gripping, holding, opening containers goal met 3. Increase right thumb ROM by 10 degrees goal met Discharge Goals: Time Frame: 90 days 1. Decrease right hand pain by 4 grades in order to participate in ADL and work related tasks goal met progress to decrease pain by 7 grades - goal met 2. Increase right hand strength by 10-15lbs for gripping, holding, opening containers goal met progress to 20lbs - goal met 3. Improve DASH score by 7 points from 40-59% to 20-39% impaired, limited, or restricted goal met progress to 10lbs  Goal met OUTCOME MEASURE:  
Tool Used: Tool Used: Disabilities of the Arm, Shoulder and Hand (DASH) Questionnaire - Quick Version Score:  Initial: 35/55  Most Recent: 16/55 (Date: 12-9-19) Interpretation of Score: The DASH is designed to measure the activities of daily living in person's with upper extremity dysfunction or pain. Each section is scored on a 1-5 scale, 5 representing the greatest disability. The scores of each section are added together for a total score of 55. Score 11 12-19 20-28 29-37 38-45 46-54 55 Modifier CH CI CJ CK CL CM CN ? Carrying, Moving, and Handling Objects:  
  - CURRENT STATUS: CI - 1%-19% impaired, limited or restricted  - GOAL STATUS: CJ - 20%-39% impaired, limited or restricted  - D/C STATUS:  CI - 1%-19% impaired, limited or restricted Total Duration:60min OT Patient Time In/Time Out Time In: 1 Time Out: 1100 Rehabilitation Potential For Stated Goals: Excellent Regarding [facility]'s therapy, I certify that the treatment plan above will be carried out by a therapist or under their direction. Thank you for this referral, Mitchell Yanes OT Referring Physician Signature: Brenda Issa MD _______________________________ Date _____________ PAIN/SUBJECTIVE:  
Initial: Pain Intensity 1: 0  Post Session:  8/10 OCCUPATIONAL PROFILE & HISTORY:  
History of Injury/Illness (Reason for Referral): Ms. Dixie Mariee presents with sprain of MCP joint of right thumb s/p injury at work: thumb pinned in cash register drawer [id number]. She presents with decreased ROM of right thumb, decreased strength of right hand and pain in right hand. Following injury she was immobilized in splint for >4 weeks adding to stiffness of right hand. Her ability to perform ADL and work related tasks has been affected and she would benefit from skilled OT to return to prior level of function. Past Medical History/Comorbidities:  
Ms. Dixie Mariee  has a past medical history of Asthma, Chronic pain, Depression, GERD (gastroesophageal reflux disease), Migraines, PCOS (polycystic ovarian syndrome), and Seizures ([facility] [address] Ms. Dixie Mariee  has a past surgical history that includes hx gyn; hx orthopaedic; hx orthopaedic (2003); and hx orthopaedic (2000). Social History/Living Environment:  
  pt lives at home with her parents Prior Level of Function/Work/Activity: 
Works full time as a  at [employer] - since injury she is limited to 6 hour shifts, light duty Dominant Side:  
      BILATERAL Other Clinical Tests:   
      9 hole peg test Right hand: 31 sec Left hand: 31 second Ambulatory/Rehab Services H2 Model Falls Risk Assessment Risk Factors: 
     No Risk Factors Identified Ability to Rise from Chair: 
     (0)  Ability to rise in a single movement Falls Prevention Plan: No modifications necessary Total: (5 or greater = High Risk): 0 ©2010 [facility] [address] [place] [address] States Patent #[id number]. Federal Law prohibits the replication, distribution or use without written permission from [facility] Current Medications:   
  
Current Outpatient Medications:  
  topiramate (TOPAMAX) 100 mg tablet, Take 1 Tab by mouth two (2) times daily (with meals). , Disp: 180 Tab, Rfl: 0 
  escitalopram oxalate (LEXAPRO) 10 mg tablet, Take 1 Tab by mouth daily. , Disp: 90 Tab, Rfl: 0 
  lamoTRIgine (LAMICTAL) 100 mg tablet, Take 1 Tab by mouth two (2) times a day., Disp: 180 Tab, Rfl: 0 
  levonorgestrel-ethinyl estradiol (ORSYTHIA) 0.1-20 mg-mcg per tablet, Take 1 Tab by mouth daily. , Disp: 1 Package, Rfl: 0 Date Last Reviewed:  12/9/2019 Complexity Level: Brief history (0):  LOW COMPLEXITY ASSESSMENT OF OCCUPATIONAL PERFORMANCE:  
Palpation:   
      normal 
ROM:   
      Right thumb active ROM [facility] Strength:   
      Right : 19#  Left : 40#  Updated 12-9-19: right : 45# Right lateral pinch: 13# left lateral pinch: 18# UPdated 12-9-29: right: 17# 
      right tip to tip pinch: 10#left tip to tip pinch: 15# updated 12-9-19: right: 14# Physical Skills Involved: 1. Range of Motion 2. Strength 3. Pain (Chronic) Cognitive Skills Affected (resulting in the inability to perform in a timely and safe manner): Psychosocial Skills Affected: 1. Habits/Routines Number of elements that affect the Plan of Care[de-identified] 1-3:  LOW COMPLEXITY CLINICAL DECISION MAKING:  
Clinical Decision-Making Assessment:    
Assessment process, impact of co-morbidities, assessment modification\need for assistance, and selection of interventions: Analytical Complexity:LOW COMPLEXITY

## 2019-12-09 NOTE — THERAPY DISCHARGE
Alphonso Stewart  : 1980  Primary: Bryan Villalobosrisk  Secondary:  [address] at [facility]  [address], [address]  [phone]   [phone]       OUTPATIENT OCCUPATIONAL THERAPY:Progress Report and Discharge Summary 2019   ICD-10: Treatment Diagnosis: M25.641 stiffness in joint, right hand, M24.541 pain in right hand, S63.641A sprain of MCP joint of right thumb   Precautions/Allergies:   Loratadine; Epinephrine; Lortab [hydrocodone-acetaminophen]; and Azithromycin   TREATMENT PLAN:  Effective Dates: 10/30/2019 TO 2020 (90 days). Frequency/Duration: 2 times a week for 90 Day(s) discharge 19 MEDICAL/REFERRING DIAGNOSIS:  Sprain of metacarpophalangeal joint of right thumb, initial encounter [C19.614D]   DATE OF ONSET: 2019  REFERRING PHYSICIAN: Tirso Sanderson MD MD Orders: evaluate and treat  Return MD Appointment: to be determined   Discharge Summary:Ms. Helio Hartley was seen for 12 OT sessions and met all OT goals. Pain decreased from 8 to 0. Right hand  strength improved from 19 to 45#, right lateral pinch from 13 to 17# and right thumb tip pinch from 10 to 14#. Her DASH score improved by 19 points. AROM of the right thumb is WNL and she has returned to her prior level of function. She is ready for discharge from OT. INITIAL ASSESSMENT:  Ms. Helio Hartley presents with sprain of MCP joint of right thumb s/p injury at work: thumb pinned in cash register drawer . She presents with decreased ROM of right thumb, decreased strength of right hand and pain in right hand. Following injury she was immobilized in splint for >4 weeks adding to stiffness of right hand. Her ability to perform ADL and work related tasks has been affected and she would benefit from skilled OT to return to prior level of function. PROBLEM LIST (Impacting functional limitations):  1. Decreased Strength  2.  Decreased ADL/Functional Activities  3. Increased Pain  4. Decreased Flexibility/Joint Mobility INTERVENTIONS PLANNED: (Treatment may consist of any combination of the following)  1. Manual therapy training  2. Modalities  3. Therapeutic activity  4. Therapeutic exercise     GOALS: (Goals have been discussed and agreed upon with patient.)  Short-Term Functional Goals: Time Frame: 45 days  1. Decrease right hand pain by 2 grades goal met  2. Increase right hand strength by 7-10lbs for gripping, holding, opening containers goal met  3. Increase right thumb ROM by 10 degrees goal met  Discharge Goals: Time Frame: 90 days  1. Decrease right hand pain by 4 grades in order to participate in ADL and work related tasks goal met progress to decrease pain by 7 grades - goal met  2. Increase right hand strength by 10-15lbs for gripping, holding, opening containers goal met progress to 20lbs - goal met  3. Improve DASH score by 7 points from 40-59% to 20-39% impaired, limited, or restricted goal met progress to 10lbs  Goal met    OUTCOME MEASURE:   Tool Used: Tool Used: Disabilities of the Arm, Shoulder and Hand (DASH) Questionnaire - Quick Version  Score:  Initial: 35/55  Most Recent: 16/55 (Date: 12-9-19)   Interpretation of Score: The DASH is designed to measure the activities of daily living in person's with upper extremity dysfunction or pain. Each section is scored on a 1-5 scale, 5 representing the greatest disability. The scores of each section are added together for a total score of 55. Score 11 12-19 20-28 29-37 38-45 46-54 55   Modifier CH CI CJ CK CL CM CN     ?  Carrying, Moving, and Handling Objects:     - CURRENT STATUS: CI - 1%-19% impaired, limited or restricted    - GOAL STATUS: CJ - 20%-39% impaired, limited or restricted    - D/C STATUS:  CI - 1%-19% impaired, limited or restricted    Total Duration:60min  OT Patient Time In/Time Out  Time In: 1005  Time Out: 1100    Rehabilitation Potential For Stated Goals: Excellent  Regarding [facility]'s therapy, I certify that the treatment plan above will be carried out by a therapist or under their direction. Thank you for this referral,  Antione Nowak, OT     Referring Physician Signature: Tim Garg MD _______________________________ Date _____________     PAIN/SUBJECTIVE:   Initial: Pain Intensity 1: 0  Post Session:  8/10   OCCUPATIONAL PROFILE & HISTORY:   History of Injury/Illness (Reason for Referral):  Ms. Jose E Aldana presents with sprain of MCP joint of right thumb s/p injury at work: thumb pinned in cash register drawer [id number]. She presents with decreased ROM of right thumb, decreased strength of right hand and pain in right hand. Following injury she was immobilized in splint for >4 weeks adding to stiffness of right hand. Her ability to perform ADL and work related tasks has been affected and she would benefit from skilled OT to return to prior level of function. Past Medical History/Comorbidities:   Ms. Jose E Aldana  has a past medical history of Asthma, Chronic pain, Depression, GERD (gastroesophageal reflux disease), Migraines, PCOS (polycystic ovarian syndrome), and Seizures ([facility] [address] Ms. Jose E Aldana  has a past surgical history that includes hx gyn; hx orthopaedic; hx orthopaedic (2003); and hx orthopaedic (2000).   Social History/Living Environment:     pt lives at home with her parents  Prior Level of Function/Work/Activity:  Works full time as a  at [employer] - since injury she is limited to 6 hour shifts, light duty   Dominant Side:         BILATERAL  Other Clinical Tests:          9 hole peg test Right hand: 31 sec Left hand: 31 second   Ambulatory/Rehab Services H2 Model Falls Risk Assessment   Risk Factors:       No Risk Factors Identified Ability to Rise from Chair:       (0)  Ability to rise in a single movement   Falls Prevention Plan:       No modifications necessary   Total: (5 or greater = High Risk): 0   ©2010 [employer]. All Rights Reserved. [facility] Patent #[id number]. Federal Law prohibits the replication, distribution or use without written permission from [facility]   Current Medications:       Current Outpatient Medications:     topiramate (TOPAMAX) 100 mg tablet, Take 1 Tab by mouth two (2) times daily (with meals). , Disp: 180 Tab, Rfl: 0    escitalopram oxalate (LEXAPRO) 10 mg tablet, Take 1 Tab by mouth daily. , Disp: 90 Tab, Rfl: 0    lamoTRIgine (LAMICTAL) 100 mg tablet, Take 1 Tab by mouth two (2) times a day., Disp: 180 Tab, Rfl: 0    levonorgestrel-ethinyl estradiol (ORSYTHIA) 0.1-20 mg-mcg per tablet, Take 1 Tab by mouth daily. , Disp: 1 Package, Rfl: 0   Date Last Reviewed:  12/9/2019   Complexity Level: Brief history (0):  LOW COMPLEXITY   ASSESSMENT OF OCCUPATIONAL PERFORMANCE:   Palpation:          normal  ROM:          Right thumb active ROM WFL  Strength:          Right : 19#  Left : 40#  Updated 12-9-19: right : 45#        Right lateral pinch: 13# left lateral pinch: 18# UPdated 12-9-29: right: 17#        right tip to tip pinch: 10#left tip to tip pinch: 15# updated 12-9-19: right: 14#   Physical Skills Involved:  1. Range of Motion  2. Strength  3. Pain (Chronic) Cognitive Skills Affected (resulting in the inability to perform in a timely and safe manner):   Psychosocial Skills Affected:  1.  Habits/Routines   Number of elements that affect the Plan of Care[de-identified] 1-3:  LOW COMPLEXITY   CLINICAL DECISION MAKING:   Clinical Decision-Making Assessment:     Assessment process, impact of co-morbidities, assessment modification\need for assistance, and selection of interventions: Analytical Complexity:LOW COMPLEXITY

## 2022-03-10 ENCOUNTER — HOSPITAL ENCOUNTER (EMERGENCY)
Age: 42
Discharge: HOME | End: 2022-03-10
Attending: EMERGENCY MEDICINE
Payer: COMMERCIAL

## 2022-03-10 VITALS
HEIGHT: 68 IN | RESPIRATION RATE: 22 BRPM | HEART RATE: 72 BPM | OXYGEN SATURATION: 98 % | SYSTOLIC BLOOD PRESSURE: 133 MMHG | DIASTOLIC BLOOD PRESSURE: 84 MMHG | TEMPERATURE: 97.8 F | WEIGHT: 230 LBS | BODY MASS INDEX: 34.86 KG/M2

## 2022-03-10 DIAGNOSIS — R56.9 SEIZURE (HCC): Primary | ICD-10-CM

## 2022-03-10 LAB
ALBUMIN SERPL-MCNC: 3.8 G/DL (ref 3.5–5)
ALBUMIN/GLOB SERPL: 0.9 {RATIO} (ref 1.2–3.5)
ALP SERPL-CCNC: 142 U/L (ref 50–130)
ALT SERPL-CCNC: 56 U/L (ref 12–65)
ANION GAP SERPL CALC-SCNC: 7 MMOL/L (ref 7–16)
AST SERPL-CCNC: 44 U/L (ref 15–37)
BACTERIA URNS QL MICRO: 0 /HPF
BASOPHILS # BLD: 0 K/UL (ref 0–0.2)
BASOPHILS NFR BLD: 0 % (ref 0–2)
BILIRUB SERPL-MCNC: 0.4 MG/DL (ref 0.2–1.1)
BUN SERPL-MCNC: 6 MG/DL (ref 6–23)
CALCIUM SERPL-MCNC: 9.2 MG/DL (ref 8.3–10.4)
CASTS URNS QL MICRO: NORMAL /LPF
CHLORIDE SERPL-SCNC: 107 MMOL/L (ref 98–107)
CO2 SERPL-SCNC: 23 MMOL/L (ref 21–32)
CREAT SERPL-MCNC: 0.77 MG/DL (ref 0.6–1)
DIFFERENTIAL METHOD BLD: ABNORMAL
EOSINOPHIL # BLD: 0 K/UL (ref 0–0.8)
EOSINOPHIL NFR BLD: 0 % (ref 0.5–7.8)
EPI CELLS #/AREA URNS HPF: NORMAL /HPF
ERYTHROCYTE [DISTWIDTH] IN BLOOD BY AUTOMATED COUNT: 13.9 % (ref 11.9–14.6)
GLOBULIN SER CALC-MCNC: 4.3 G/DL (ref 2.3–3.5)
GLUCOSE SERPL-MCNC: 100 MG/DL (ref 65–100)
HCT VFR BLD AUTO: 45.3 % (ref 35.8–46.3)
HGB BLD-MCNC: 14.1 G/DL (ref 11.7–15.4)
IMM GRANULOCYTES # BLD AUTO: 0.1 K/UL (ref 0–0.5)
IMM GRANULOCYTES NFR BLD AUTO: 1 % (ref 0–5)
LYMPHOCYTES # BLD: 2.1 K/UL (ref 0.5–4.6)
LYMPHOCYTES NFR BLD: 21 % (ref 13–44)
MAGNESIUM SERPL-MCNC: 1.9 MG/DL (ref 1.8–2.4)
MCH RBC QN AUTO: 26.4 PG (ref 26.1–32.9)
MCHC RBC AUTO-ENTMCNC: 31.1 G/DL (ref 31.4–35)
MCV RBC AUTO: 84.7 FL (ref 79.6–97.8)
MONOCYTES # BLD: 0.5 K/UL (ref 0.1–1.3)
MONOCYTES NFR BLD: 5 % (ref 4–12)
NEUTS SEG # BLD: 7.3 K/UL (ref 1.7–8.2)
NEUTS SEG NFR BLD: 73 % (ref 43–78)
NRBC # BLD: 0 K/UL (ref 0–0.2)
PLATELET # BLD AUTO: 309 K/UL (ref 150–450)
PMV BLD AUTO: 9.8 FL (ref 9.4–12.3)
POTASSIUM SERPL-SCNC: 3.7 MMOL/L (ref 3.5–5.1)
PROT SERPL-MCNC: 8.1 G/DL (ref 6.3–8.2)
RBC # BLD AUTO: 5.35 M/UL (ref 4.05–5.2)
RBC #/AREA URNS HPF: NORMAL /HPF
SODIUM SERPL-SCNC: 137 MMOL/L (ref 136–145)
WBC # BLD AUTO: 10 K/UL (ref 4.3–11.1)
WBC URNS QL MICRO: NORMAL /HPF

## 2022-03-10 PROCEDURE — 96375 TX/PRO/DX INJ NEW DRUG ADDON: CPT

## 2022-03-10 PROCEDURE — 85025 COMPLETE CBC W/AUTO DIFF WBC: CPT

## 2022-03-10 PROCEDURE — 94762 N-INVAS EAR/PLS OXIMTRY CONT: CPT

## 2022-03-10 PROCEDURE — 93005 ELECTROCARDIOGRAM TRACING: CPT | Performed by: EMERGENCY MEDICINE

## 2022-03-10 PROCEDURE — 74011000258 HC RX REV CODE- 258: Performed by: EMERGENCY MEDICINE

## 2022-03-10 PROCEDURE — 81015 MICROSCOPIC EXAM OF URINE: CPT

## 2022-03-10 PROCEDURE — 99284 EMERGENCY DEPT VISIT MOD MDM: CPT

## 2022-03-10 PROCEDURE — 83735 ASSAY OF MAGNESIUM: CPT

## 2022-03-10 PROCEDURE — 96374 THER/PROPH/DIAG INJ IV PUSH: CPT

## 2022-03-10 PROCEDURE — 74011250636 HC RX REV CODE- 250/636: Performed by: EMERGENCY MEDICINE

## 2022-03-10 PROCEDURE — 80053 COMPREHEN METABOLIC PANEL: CPT

## 2022-03-10 RX ORDER — SODIUM CHLORIDE 0.9 % (FLUSH) 0.9 %
5-10 SYRINGE (ML) INJECTION AS NEEDED
Status: DISCONTINUED | OUTPATIENT
Start: 2022-03-10 | End: 2022-03-11 | Stop reason: HOSPADM

## 2022-03-10 RX ORDER — KETOROLAC TROMETHAMINE 30 MG/ML
30 INJECTION, SOLUTION INTRAMUSCULAR; INTRAVENOUS
Status: COMPLETED | OUTPATIENT
Start: 2022-03-10 | End: 2022-03-10

## 2022-03-10 RX ORDER — SODIUM CHLORIDE 0.9 % (FLUSH) 0.9 %
5-10 SYRINGE (ML) INJECTION EVERY 8 HOURS
Status: DISCONTINUED | OUTPATIENT
Start: 2022-03-10 | End: 2022-03-11 | Stop reason: HOSPADM

## 2022-03-10 RX ADMIN — SODIUM CHLORIDE 1000 ML: 900 INJECTION, SOLUTION INTRAVENOUS at 20:06

## 2022-03-10 RX ADMIN — LEVETIRACETAM 1000 MG: 100 INJECTION, SOLUTION INTRAVENOUS at 20:39

## 2022-03-10 RX ADMIN — KETOROLAC TROMETHAMINE 30 MG: 30 INJECTION, SOLUTION INTRAMUSCULAR at 20:37

## 2022-03-11 LAB
ATRIAL RATE: 78 BPM
CALCULATED P AXIS, ECG09: 63 DEGREES
CALCULATED R AXIS, ECG10: -13 DEGREES
CALCULATED T AXIS, ECG11: 14 DEGREES
DIAGNOSIS, 93000: NORMAL
P-R INTERVAL, ECG05: 152 MS
Q-T INTERVAL, ECG07: 396 MS
QRS DURATION, ECG06: 94 MS
QTC CALCULATION (BEZET), ECG08: 451 MS
VENTRICULAR RATE, ECG03: 78 BPM

## 2022-03-11 NOTE — ED TRIAGE NOTES
Patient arrives via [facility] coming from Physical Therapy \"across the street\". EMS reports patient had 2 seizures about 1 hour ago. The first seizure lasted about 3 to 4 minutes with \"in and out breathing\", and second seizure occurred a couple minutes later and lasted about 20 to 30 seconds. Patient has history of seizures and pt is compliant with medications. Denies urinating during seizure. Patient was A/O x 4 with EMS. EMS vital signs: BP: 152/101, HR: 84, O2:99% RA,     On arrival patient complains of feeling like she \"ran a marathon\" and has a headache. Patient A/O x 4.

## 2022-03-11 NOTE — DISCHARGE INSTRUCTIONS
Continue your medicines as prescribed. It is very poor to call your neurologist as discussed. No driving until seizure free for 6 months per the [facility], no climbing heights or using ladders, no bathtubs or swimming alone, and no using heavy machinery. Avoid standing over open flames or hot stove tops or any other activities where patient could harm himself or others of loss of consciousness were to occur. No supervising small children.

## 2022-03-11 NOTE — ED NOTES
I have reviewed discharge instructions with the patient. The patient verbalized understanding. Patient left ED via Discharge Method: ambulatory to Home with family. Opportunity for questions and clarification provided. Patient given 0 scripts. To continue your aftercare when you leave the hospital, you may receive an automated call from our care team to check in on how you are doing. This is a free service and part of our promise to provide the best care and service to meet your aftercare needs.  If you have questions, or wish to unsubscribe from this service please call [phone]. Thank you for Choosing our [facility] Emergency Department.

## 2022-03-11 NOTE — ED PROVIDER NOTES
[address] [facility] is a 46 y.o. female seen on 3/10/2022 in the [address] [place] [address] DEPT in room ER02/02. Chief Complaint   Patient presents with    Seizure     HPI: 81-year-old  female with history of seizures presented to the emergency department after having 2 witnessed seizures while across the street at physical therapy. Per EMS patient had a seizure that lasted 3 to 4 minutes and then a second seizure that lasted 20 to 30 seconds. Patient does not remember having the seizures however she does remember the \"aura\" that she gets prior to her seizures. Patient was seen 9 days ago by her neurologist and had her medications changed. Patient has been on Lamictal and Topamax for \"years\" and has been well controlled with her seizures although she did have a breakthrough seizure secondary to noncompliance in December. Patient states that she has been taking her medicines as prescribed. She states that she feels fatigued and like she \"ran a marathon. \"  She has a mild headache. She denies any chest pain, shortness of breath, nausea, vomiting, diarrhea or any other concerns. Historian: Patient    REVIEW OF SYSTEMS     Review of Systems   Constitutional: Positive for fatigue. HENT: Negative. Respiratory: Negative. Cardiovascular: Negative. Gastrointestinal: Negative. Genitourinary: Negative. Musculoskeletal: Negative. Skin: Negative. Neurological: Positive for seizures and headaches. Psychiatric/Behavioral: Negative. All other systems reviewed and are negative.       PAST MEDICAL HISTORY     Past Medical History:   Diagnosis Date    Asthma     Chronic pain     Depression     and bipolar    GERD (gastroesophageal reflux disease)     Migraines     no aura    PCOS (polycystic ovarian syndrome)     Seizures (HCC)      Past Surgical History:   Procedure Laterality Date    HX GYN      l/s cystectomy age 34   [de-identified] ORTHOPAEDIC knee surgery R-2000, L-2001    HX ORTHOPAEDIC  2003    shoulder surgery     HX ORTHOPAEDIC  2000    jaw surgery     Social History     Socioeconomic History    Marital status: SINGLE   Tobacco Use    Smoking status: Former Smoker     Packs/day: 0.10     Years: 15.00     Pack years: 1.50    Smokeless tobacco: Never Used   Substance and Sexual Activity    Alcohol use: No    Drug use: No    Sexual activity: Not Currently     Birth control/protection: Pill     Prior to Admission Medications   Prescriptions Last Dose Informant Patient Reported? Taking?   escitalopram oxalate (LEXAPRO) 10 mg tablet   No No   Sig: Take 1 Tab by mouth daily. lamoTRIgine (LAMICTAL) 100 mg tablet   No No   Sig: Take 1 Tab by mouth two (2) times a day. levonorgestrel-ethinyl estradiol (ORSYTHIA) 0.1-20 mg-mcg per tablet   No No   Sig: Take 1 Tab by mouth daily. topiramate (TOPAMAX) 100 mg tablet   No No   Sig: Take 1 Tab by mouth two (2) times daily (with meals). Facility-Administered Medications: None     Allergies   Allergen Reactions    Loratadine Rash    Epinephrine Other (comments)     Panic attacks    Lortab [Hydrocodone-Acetaminophen] Nausea and Vomiting    Azithromycin Other (comments)     Side effects are worse and react with other meds        PHYSICAL EXAM       Vitals:    03/10/22 1936   BP: (!) 184/80   Pulse: 83   Resp: 18   Temp: 97.8 °F (36.6 °C)   SpO2: 97%    Vital signs were reviewed. Physical Exam  Vitals and nursing note reviewed. Constitutional:       General: She is not in acute distress. Appearance: Normal appearance. She is not ill-appearing or toxic-appearing. HENT:      Head: Normocephalic and atraumatic. Mouth/Throat:      Mouth: Mucous membranes are dry. Eyes:      Extraocular Movements: Extraocular movements intact. Pupils: Pupils are equal, round, and reactive to light. Cardiovascular:      Rate and Rhythm: Normal rate and regular rhythm.       Pulses: Normal pulses. Heart sounds: Normal heart sounds. Pulmonary:      Effort: Pulmonary effort is normal.      Breath sounds: Normal breath sounds. Abdominal:      Palpations: Abdomen is soft. Tenderness: There is no abdominal tenderness. There is no guarding. Musculoskeletal:         General: Normal range of motion. Cervical back: Normal range of motion. Skin:     General: Skin is warm and dry. Neurological:      General: No focal deficit present. Mental Status: She is alert and oriented to person, place, and time. Mental status is at baseline. Psychiatric:         Mood and Affect: Mood normal.         Behavior: Behavior normal.         Thought Content: Thought content normal.         Judgment: Judgment normal.          MEDICAL DECISION MAKING     ED Course:    Orders Placed This Encounter    CBC with Diff    CMP    Magnesium    POC Urine Dipstick    PULSE OXIMETRY CONTINUOUS    SALINE LOCK IV ONE TIME Routine    Seizure Precautions    sodium chloride (NS) flush 5-10 mL    sodium chloride (NS) flush 5-10 mL    sodium chloride 0.9 % bolus infusion 1,000 mL    ketorolac (TORADOL) injection 30 mg    levETIRAcetam (KEPPRA) 1,000 mg in 0.9% sodium chloride 100 mL IVPB     Recent Results (from the past 8 hour(s))   CBC WITH AUTOMATED DIFF    Collection Time: 03/10/22  8:02 PM   Result Value Ref Range    WBC 10.0 4.3 - 11.1 K/uL    RBC 5.35 (H) 4.05 - 5.2 M/uL    HGB 14.1 11.7 - 15.4 g/dL    HCT 45.3 35.8 - 46.3 %    MCV 84.7 79.6 - 97.8 FL    MCH 26.4 26.1 - 32.9 PG    MCHC 31.1 (L) 31.4 - 35.0 g/dL    RDW 13.9 11.9 - 14.6 %    PLATELET 556 972 - 171 K/uL    MPV 9.8 9.4 - 12.3 FL    ABSOLUTE NRBC 0.00 0.0 - 0.2 K/uL    DF AUTOMATED      NEUTROPHILS 73 43 - 78 %    LYMPHOCYTES 21 13 - 44 %    MONOCYTES 5 4.0 - 12.0 %    EOSINOPHILS 0 (L) 0.5 - 7.8 %    BASOPHILS 0 0.0 - 2.0 %    IMMATURE GRANULOCYTES 1 0.0 - 5.0 %    ABS. NEUTROPHILS 7.3 1.7 - 8.2 K/UL    ABS.  LYMPHOCYTES 2.1 0.5 - 4.6 K/UL    ABS. MONOCYTES 0.5 0.1 - 1.3 K/UL    ABS. EOSINOPHILS 0.0 0.0 - 0.8 K/UL    ABS. BASOPHILS 0.0 0.0 - 0.2 K/UL    ABS. IMM. GRANS. 0.1 0.0 - 0.5 K/UL   METABOLIC PANEL, COMPREHENSIVE    Collection Time: 03/10/22  8:02 PM   Result Value Ref Range    Sodium 137 136 - 145 mmol/L    Potassium 3.7 3.5 - 5.1 mmol/L    Chloride 107 98 - 107 mmol/L    CO2 23 21 - 32 mmol/L    Anion gap 7 7 - 16 mmol/L    Glucose 100 65 - 100 mg/dL    BUN 6 6 - 23 MG/DL    Creatinine 0.77 0.6 - 1.0 MG/DL    GFR est AA >60 >60 ml/min/1.73m2    GFR est non-AA >60 >60 ml/min/1.73m2    Calcium 9.2 8.3 - 10.4 MG/DL    Bilirubin, total 0.4 0.2 - 1.1 MG/DL    ALT (SGPT) 56 12 - 65 U/L    AST (SGOT) 44 (H) 15 - 37 U/L    Alk. phosphatase 142 (H) 50 - 130 U/L    Protein, total 8.1 6.3 - 8.2 g/dL    Albumin 3.8 3.5 - 5.0 g/dL    Globulin 4.3 (H) 2.3 - 3.5 g/dL    A-G Ratio 0.9 (L) 1.2 - 3.5     MAGNESIUM    Collection Time: 03/10/22  8:02 PM   Result Value Ref Range    Magnesium 1.9 1.8 - 2.4 mg/dL     No results found. EKG interpretation personally: Rate 78. Normal sinus rhythm. Normal UT and QT intervals. MDM  Number of Diagnoses or Management Options  Diagnosis management comments: 42-year-old female with history of seizure disorder presented to the emergency department after having 2 seizures today. Patient with recent changes in her medications. Due to the complexity of the patient history and recent med changes. Patient will be given Keppra here in the emergency department and encouraged to follow-up with her neurologist tomorrow. Patient is agreeable to this plan. She states he has never had Keppra and would like to see if she tolerates it better than her other medications. Seizure precautions given the patient and she will follow up tomorrow with neurology.        Amount and/or Complexity of Data Reviewed  Clinical lab tests: ordered and reviewed  Decide to obtain previous medical records or to obtain history from someone other than the patient: yes  Review and summarize past medical records: yes    Patient Progress  Patient progress: improved    SEIZURE -Prior to leaving the emergency department medical recommendations regarding seizures were addressed as well as instructions not to drive for six (6) months or undertake any activities during which a seizure could cause bodily injury to the patient or others until they are cleared by their doctor. Disposition: Discharged  Diagnosis:     ICD-10-CM ICD-9-CM   1. Seizure ([facility].)  R56.9 780.39     ____________________________________________________________________  A portion of this note was generated using voice recognition dictation software. While the note has been reviewed for accuracy, please note certain words and phrases may not be transcribed as intended and some grammatical and/or typographical errors may be present.

## 2022-03-18 PROBLEM — M51.26 HNP (HERNIATED NUCLEUS PULPOSUS), LUMBAR: Status: ACTIVE | Noted: 2018-02-13

## 2022-03-18 PROBLEM — E66.9 OBESITY, CLASS I, BMI 30-34.9: Status: ACTIVE | Noted: 2019-06-09

## 2022-03-18 PROBLEM — G43.511 INTRACTABLE PERSISTENT MIGRAINE AURA WITHOUT CEREBRAL INFARCTION AND WITH STATUS MIGRAINOSUS: Status: ACTIVE | Noted: 2019-06-09

## 2022-03-19 PROBLEM — G89.29 CHRONIC BILATERAL LOW BACK PAIN WITH BILATERAL SCIATICA: Status: ACTIVE | Noted: 2018-02-13

## 2022-03-19 PROBLEM — M54.42 CHRONIC BILATERAL LOW BACK PAIN WITH BILATERAL SCIATICA: Status: ACTIVE | Noted: 2018-02-13

## 2022-03-19 PROBLEM — M54.41 CHRONIC BILATERAL LOW BACK PAIN WITH BILATERAL SCIATICA: Status: ACTIVE | Noted: 2018-02-13

## 2022-03-19 PROBLEM — F33.41 RECURRENT MAJOR DEPRESSIVE DISORDER, IN PARTIAL REMISSION (HCC): Status: ACTIVE | Noted: 2019-07-22

## 2022-03-19 PROBLEM — M54.2 NECK PAIN: Status: ACTIVE | Noted: 2018-02-13

## 2023-03-14 NOTE — PERIOP NOTE
Attempted to locate medical records for jaw surgery from [facility] in [place]. Unable to obtain records as was told medical records only available for last 10 years.

## 2023-03-14 NOTE — PERIOP NOTE
Patient verified name and . Order for consent NOT found in EHR at time of PAT visit. Unable to verify case posting against order; surgery verified by patient. Type 1B surgery, PAT phone assessment complete. Orders not received. Labs per surgeon: unknown; no orders received    Labs per anesthesia protocol: POC glucose DOS  Summary sent for anesthesia to review the following: patient states spiked high fever after jaw advancement surgery ~2000 in [place]. Attempting to locate records from [facility], but department not yet open. Will try again later today. Patient states no known personal or family history of MH. Has had surgeries since with no problems. Chart flagged for charge nurse. Patient answered medical/surgical history questions at their best of ability. All prior to admission medications documented in EPIC. Patient instructed to take the following medications the day of surgery according to anesthesia guidelines with a small sip of water: Rexulti, Lexapro, Lamictal, BCP On the day before surgery please take Acetaminophen 1000mg in the morning and then again before bed. You may substitute for Tylenol 650 mg. Hold all vitamins 7 days prior to surgery and NSAIDS 5 days prior to surgery. Prescription meds to hold:none  Patient instructed on the following:    > Arrive at [facility], time of arrival to be called the day before by 1700  > NPO after midnight, unless otherwise indicated, including gum, mints, and ice chips  > Responsible adult must drive patient to the hospital, stay during surgery, and patient will need supervision 24 hours after anesthesia  > Use antibacterial soap in shower the night before surgery and on the morning of surgery  > All piercings must be removed prior to arrival.    > Leave all valuables (money and jewelry) at home but bring insurance card and ID on DOS.   > You may be required to pay a deductible or co-pay on the day of your procedure.  You can pre-pay by

## 2023-03-24 NOTE — PROGRESS NOTES
Preop department called to notify patient of arrival time for scheduled procedure. Instructions given to   - Arrive at [address] - Remain NPO after midnight, unless otherwise indicated, including gum, mints, and ice chips. - Have a responsible adult to drive patient to the hospital, stay during surgery, and patient will need supervision 24 hours after anesthesia. - Use antibacterial soap in shower the night before surgery and on the morning of surgery.        Was patient contacted: voicemail  Voicemail left: yes  Numbers contacted: [phone]   Arrival time: 0630

## 2023-03-24 NOTE — PERIOP NOTE
Preop department called to notify patient of arrival time for scheduled procedure. Instructions given to   - Arrive at [address]. - Remain NPO after midnight, unless otherwise indicated, including gum, mints, and ice chips. - Have a responsible adult to drive patient to the hospital, stay during surgery, and patient will need supervision 24 hours after anesthesia. - Use antibacterial soap in shower the night before surgery and on the morning of surgery.        Was patient contacted: yes  Voicemail left:   Numbers contacted: [phone]   Arrival time: 0856

## 2023-03-26 ENCOUNTER — ANESTHESIA EVENT (OUTPATIENT)
Dept: SURGERY | Age: 43
End: 2023-03-26
Payer: COMMERCIAL

## 2023-03-26 RX ORDER — HYDROMORPHONE HYDROCHLORIDE 2 MG/ML
0.5 INJECTION, SOLUTION INTRAMUSCULAR; INTRAVENOUS; SUBCUTANEOUS EVERY 10 MIN PRN
Status: CANCELLED | OUTPATIENT
Start: 2023-03-26

## 2023-03-27 ENCOUNTER — ANESTHESIA (OUTPATIENT)
Dept: SURGERY | Age: 43
End: 2023-03-27
Payer: COMMERCIAL

## 2023-03-27 ENCOUNTER — HOSPITAL ENCOUNTER (OUTPATIENT)
Age: 43
Setting detail: OUTPATIENT SURGERY
Discharge: HOME | End: 2023-03-27
Attending: OTOLARYNGOLOGY | Admitting: OTOLARYNGOLOGY
Payer: COMMERCIAL

## 2023-03-27 VITALS
TEMPERATURE: 97.2 F | OXYGEN SATURATION: 99 % | HEIGHT: 68 IN | DIASTOLIC BLOOD PRESSURE: 64 MMHG | WEIGHT: 214 LBS | HEART RATE: 70 BPM | BODY MASS INDEX: 32.43 KG/M2 | RESPIRATION RATE: 20 BRPM | SYSTOLIC BLOOD PRESSURE: 117 MMHG

## 2023-03-27 LAB — HCG UR QL: NEGATIVE

## 2023-03-27 PROCEDURE — 6360000002 HC RX W HCPCS: Performed by: ANESTHESIOLOGY

## 2023-03-27 PROCEDURE — 3600000014 HC SURGERY LEVEL 4 ADDTL 15MIN: Performed by: OTOLARYNGOLOGY

## 2023-03-27 PROCEDURE — 7100000010 HC PHASE II RECOVERY - FIRST 15 MIN: Performed by: OTOLARYNGOLOGY

## 2023-03-27 PROCEDURE — 2500000003 HC RX 250 WO HCPCS: Performed by: NURSE ANESTHETIST, CERTIFIED REGISTERED

## 2023-03-27 PROCEDURE — 6360000002 HC RX W HCPCS: Performed by: NURSE ANESTHETIST, CERTIFIED REGISTERED

## 2023-03-27 PROCEDURE — 6370000000 HC RX 637 (ALT 250 FOR IP): Performed by: OTOLARYNGOLOGY

## 2023-03-27 PROCEDURE — 2580000003 HC RX 258: Performed by: ANESTHESIOLOGY

## 2023-03-27 PROCEDURE — 3700000001 HC ADD 15 MINUTES (ANESTHESIA): Performed by: OTOLARYNGOLOGY

## 2023-03-27 PROCEDURE — 7100000000 HC PACU RECOVERY - FIRST 15 MIN: Performed by: OTOLARYNGOLOGY

## 2023-03-27 PROCEDURE — 7100000011 HC PHASE II RECOVERY - ADDTL 15 MIN: Performed by: OTOLARYNGOLOGY

## 2023-03-27 PROCEDURE — 7100000001 HC PACU RECOVERY - ADDTL 15 MIN: Performed by: OTOLARYNGOLOGY

## 2023-03-27 PROCEDURE — 3600000004 HC SURGERY LEVEL 4 BASE: Performed by: OTOLARYNGOLOGY

## 2023-03-27 PROCEDURE — 3700000000 HC ANESTHESIA ATTENDED CARE: Performed by: OTOLARYNGOLOGY

## 2023-03-27 PROCEDURE — 81025 URINE PREGNANCY TEST: CPT

## 2023-03-27 PROCEDURE — 2709999900 HC NON-CHARGEABLE SUPPLY: Performed by: OTOLARYNGOLOGY

## 2023-03-27 PROCEDURE — C1726 CATH, BAL DIL, NON-VASCULAR: HCPCS | Performed by: OTOLARYNGOLOGY

## 2023-03-27 PROCEDURE — 6370000000 HC RX 637 (ALT 250 FOR IP): Performed by: ANESTHESIOLOGY

## 2023-03-27 RX ORDER — SODIUM CHLORIDE 0.9 % (FLUSH) 0.9 %
5-40 SYRINGE (ML) INJECTION EVERY 12 HOURS SCHEDULED
Status: DISCONTINUED | OUTPATIENT
Start: 2023-03-27 | End: 2023-03-27 | Stop reason: HOSPADM

## 2023-03-27 RX ORDER — SODIUM CHLORIDE 0.9 % (FLUSH) 0.9 %
5-40 SYRINGE (ML) INJECTION PRN
Status: DISCONTINUED | OUTPATIENT
Start: 2023-03-27 | End: 2023-03-27 | Stop reason: HOSPADM

## 2023-03-27 RX ORDER — ONDANSETRON 2 MG/ML
INJECTION INTRAMUSCULAR; INTRAVENOUS PRN
Status: DISCONTINUED | OUTPATIENT
Start: 2023-03-27 | End: 2023-03-27 | Stop reason: SDUPTHER

## 2023-03-27 RX ORDER — DIPHENHYDRAMINE HYDROCHLORIDE 50 MG/ML
12.5 INJECTION INTRAMUSCULAR; INTRAVENOUS
Status: DISCONTINUED | OUTPATIENT
Start: 2023-03-27 | End: 2023-03-27 | Stop reason: HOSPADM

## 2023-03-27 RX ORDER — GINSENG 100 MG
CAPSULE ORAL PRN
Status: DISCONTINUED | OUTPATIENT
Start: 2023-03-27 | End: 2023-03-27 | Stop reason: HOSPADM

## 2023-03-27 RX ORDER — DEXTROSE MONOHYDRATE 100 MG/ML
INJECTION, SOLUTION INTRAVENOUS CONTINUOUS PRN
Status: DISCONTINUED | OUTPATIENT
Start: 2023-03-27 | End: 2023-03-27 | Stop reason: HOSPADM

## 2023-03-27 RX ORDER — PROPOFOL 10 MG/ML
INJECTION, EMULSION INTRAVENOUS PRN
Status: DISCONTINUED | OUTPATIENT
Start: 2023-03-27 | End: 2023-03-27 | Stop reason: SDUPTHER

## 2023-03-27 RX ORDER — MIDAZOLAM HYDROCHLORIDE 2 MG/2ML
2 INJECTION, SOLUTION INTRAMUSCULAR; INTRAVENOUS
Status: DISCONTINUED | OUTPATIENT
Start: 2023-03-27 | End: 2023-03-27 | Stop reason: HOSPADM

## 2023-03-27 RX ORDER — LIDOCAINE HYDROCHLORIDE 20 MG/ML
INJECTION, SOLUTION EPIDURAL; INFILTRATION; INTRACAUDAL; PERINEURAL PRN
Status: DISCONTINUED | OUTPATIENT
Start: 2023-03-27 | End: 2023-03-27 | Stop reason: SDUPTHER

## 2023-03-27 RX ORDER — SODIUM CHLORIDE, SODIUM LACTATE, POTASSIUM CHLORIDE, CALCIUM CHLORIDE 600; 310; 30; 20 MG/100ML; MG/100ML; MG/100ML; MG/100ML
INJECTION, SOLUTION INTRAVENOUS CONTINUOUS
Status: DISCONTINUED | OUTPATIENT
Start: 2023-03-27 | End: 2023-03-27 | Stop reason: HOSPADM

## 2023-03-27 RX ORDER — LIDOCAINE HYDROCHLORIDE 10 MG/ML
1 INJECTION, SOLUTION INFILTRATION; PERINEURAL
Status: DISCONTINUED | OUTPATIENT
Start: 2023-03-27 | End: 2023-03-27 | Stop reason: HOSPADM

## 2023-03-27 RX ORDER — ROCURONIUM BROMIDE 10 MG/ML
INJECTION, SOLUTION INTRAVENOUS PRN
Status: DISCONTINUED | OUTPATIENT
Start: 2023-03-27 | End: 2023-03-27 | Stop reason: SDUPTHER

## 2023-03-27 RX ORDER — DEXMEDETOMIDINE HYDROCHLORIDE 4 UG/ML
INJECTION, SOLUTION INTRAVENOUS CONTINUOUS PRN
Status: DISCONTINUED | OUTPATIENT
Start: 2023-03-27 | End: 2023-03-27 | Stop reason: SDUPTHER

## 2023-03-27 RX ORDER — OXYMETAZOLINE HYDROCHLORIDE 0.05 G/100ML
SPRAY NASAL PRN
Status: DISCONTINUED | OUTPATIENT
Start: 2023-03-27 | End: 2023-03-27 | Stop reason: HOSPADM

## 2023-03-27 RX ORDER — PROCHLORPERAZINE EDISYLATE 5 MG/ML
5 INJECTION INTRAMUSCULAR; INTRAVENOUS
Status: DISCONTINUED | OUTPATIENT
Start: 2023-03-27 | End: 2023-03-27 | Stop reason: HOSPADM

## 2023-03-27 RX ORDER — GLYCOPYRROLATE 0.2 MG/ML
INJECTION INTRAMUSCULAR; INTRAVENOUS PRN
Status: DISCONTINUED | OUTPATIENT
Start: 2023-03-27 | End: 2023-03-27 | Stop reason: SDUPTHER

## 2023-03-27 RX ORDER — OXYCODONE HYDROCHLORIDE 5 MG/1
5 TABLET ORAL
Status: COMPLETED | OUTPATIENT
Start: 2023-03-27 | End: 2023-03-27

## 2023-03-27 RX ORDER — KETAMINE HYDROCHLORIDE 50 MG/ML
INJECTION, SOLUTION, CONCENTRATE INTRAMUSCULAR; INTRAVENOUS PRN
Status: DISCONTINUED | OUTPATIENT
Start: 2023-03-27 | End: 2023-03-27 | Stop reason: SDUPTHER

## 2023-03-27 RX ORDER — ACETAMINOPHEN 500 MG
1000 TABLET ORAL ONCE
Status: DISCONTINUED | OUTPATIENT
Start: 2023-03-27 | End: 2023-03-27 | Stop reason: HOSPADM

## 2023-03-27 RX ORDER — SODIUM CHLORIDE 9 MG/ML
INJECTION, SOLUTION INTRAVENOUS PRN
Status: DISCONTINUED | OUTPATIENT
Start: 2023-03-27 | End: 2023-03-27 | Stop reason: HOSPADM

## 2023-03-27 RX ORDER — MORPHINE SULFATE 4 MG/ML
4 INJECTION INTRAVENOUS EVERY 10 MIN PRN
Status: CANCELLED | OUTPATIENT
Start: 2023-03-27

## 2023-03-27 RX ORDER — NEOSTIGMINE METHYLSULFATE 1 MG/ML
INJECTION, SOLUTION INTRAVENOUS PRN
Status: DISCONTINUED | OUTPATIENT
Start: 2023-03-27 | End: 2023-03-27 | Stop reason: SDUPTHER

## 2023-03-27 RX ORDER — FENTANYL CITRATE 50 UG/ML
100 INJECTION, SOLUTION INTRAMUSCULAR; INTRAVENOUS
Status: DISCONTINUED | OUTPATIENT
Start: 2023-03-27 | End: 2023-03-27 | Stop reason: HOSPADM

## 2023-03-27 RX ORDER — DEXAMETHASONE SODIUM PHOSPHATE 4 MG/ML
INJECTION, SOLUTION INTRA-ARTICULAR; INTRALESIONAL; INTRAMUSCULAR; INTRAVENOUS; SOFT TISSUE PRN
Status: DISCONTINUED | OUTPATIENT
Start: 2023-03-27 | End: 2023-03-27 | Stop reason: SDUPTHER

## 2023-03-27 RX ORDER — DEXMEDETOMIDINE HYDROCHLORIDE 100 UG/ML
INJECTION, SOLUTION INTRAVENOUS PRN
Status: DISCONTINUED | OUTPATIENT
Start: 2023-03-27 | End: 2023-03-27 | Stop reason: SDUPTHER

## 2023-03-27 RX ORDER — APREPITANT 40 MG/1
40 CAPSULE ORAL ONCE
Status: COMPLETED | OUTPATIENT
Start: 2023-03-27 | End: 2023-03-27

## 2023-03-27 RX ADMIN — DEXMEDETOMIDINE HYDROCHLORIDE 0.8 MCG/KG/HR: 4 INJECTION, SOLUTION INTRAVENOUS at 08:14

## 2023-03-27 RX ADMIN — FENTANYL CITRATE 100 MCG: 50 INJECTION INTRAMUSCULAR; INTRAVENOUS at 08:06

## 2023-03-27 RX ADMIN — Medication 4 MG: at 08:55

## 2023-03-27 RX ADMIN — ROCURONIUM BROMIDE 30 MG: 50 INJECTION, SOLUTION INTRAVENOUS at 08:12

## 2023-03-27 RX ADMIN — DEXMEDETOMIDINE 20 MCG: 100 INJECTION, SOLUTION, CONCENTRATE INTRAVENOUS at 08:05

## 2023-03-27 RX ADMIN — ONDANSETRON 4 MG: 2 INJECTION INTRAMUSCULAR; INTRAVENOUS at 08:20

## 2023-03-27 RX ADMIN — KETAMINE HYDROCHLORIDE 20 MG: 50 INJECTION, SOLUTION INTRAMUSCULAR; INTRAVENOUS at 08:11

## 2023-03-27 RX ADMIN — GLYCOPYRROLATE 0.4 MG: 0.2 INJECTION INTRAMUSCULAR; INTRAVENOUS at 08:55

## 2023-03-27 RX ADMIN — PROPOFOL 250 MCG/KG/MIN: 10 INJECTION, EMULSION INTRAVENOUS at 08:18

## 2023-03-27 RX ADMIN — LIDOCAINE HYDROCHLORIDE 100 MG: 20 INJECTION, SOLUTION EPIDURAL; INFILTRATION; INTRACAUDAL; PERINEURAL at 08:12

## 2023-03-27 RX ADMIN — OXYCODONE HYDROCHLORIDE 5 MG: 5 TABLET ORAL at 09:45

## 2023-03-27 RX ADMIN — PROPOFOL 200 MG: 10 INJECTION, EMULSION INTRAVENOUS at 08:12

## 2023-03-27 RX ADMIN — DEXAMETHASONE SODIUM PHOSPHATE 10 MG: 4 INJECTION, SOLUTION INTRAMUSCULAR; INTRAVENOUS at 08:20

## 2023-03-27 RX ADMIN — SODIUM CHLORIDE, POTASSIUM CHLORIDE, SODIUM LACTATE AND CALCIUM CHLORIDE: 600; 310; 30; 20 INJECTION, SOLUTION INTRAVENOUS at 07:10

## 2023-03-27 RX ADMIN — APREPITANT 40 MG: 40 CAPSULE ORAL at 07:11

## 2023-03-27 ASSESSMENT — PAIN - FUNCTIONAL ASSESSMENT: PAIN_FUNCTIONAL_ASSESSMENT: 0-10

## 2023-03-27 ASSESSMENT — PAIN SCALES - GENERAL
PAINLEVEL_OUTOF10: 0
PAINLEVEL_OUTOF10: 6
PAINLEVEL_OUTOF10: 3

## 2023-03-27 ASSESSMENT — PAIN DESCRIPTION - LOCATION: LOCATION: NOSE

## 2023-03-27 ASSESSMENT — PAIN DESCRIPTION - DESCRIPTORS: DESCRIPTORS: BURNING

## 2023-03-27 NOTE — DISCHARGE INSTRUCTIONS
Things to Remember After Sinus Surgery    1. Sleep and rest with head elevated on several pillows to decrease swelling and pressure. 2. You will have a 2 X 2 gauze under your nose to absorb the bloody discharge you will have the first 2 to 3 days after surgery. If you saturate the gauze more than 3 times in 30 minutes, please notify the office. 3. Numbness to the front teeth after nasal surgery is normal. The feeling usually returns in 6 to 8 weeks. 4. Clean the end of your nose with hydrogen peroxide. Do not try to clean inside. It may cause bleeding. 5. Do not blow your nose until you have had your first post-op appointment ( at least one week after surgery). 6. For a mild pain and a low-grade temperature, you may take Tylenol. No aspirin, Motrin, Advil, or Aleve for at least 3 weeks after nasal surgery. 7. If the nasal surgery requires an exterior cast and the cast falls off, please notify the office during office hours. 8. Nasal congestion after surgery is normal and will resolve after the splints and/or packing are removed. ACTIVITY   Bathe or shower as directed by your doctor. Avoid strenuous work and becoming overheated. Activity as directed by your doctor   Avoid bending over. DIET   Clear, cool liquids the first day; then soft diet the second day   Advance to regular diet on third day, unless your doctor orders otherwise. No milk products or foods with red color dyes   If nausea and vomiting continues, call your doctor. PAIN   Take pain medication as directed by your doctor. Call your doctor if pain is NOT relieved by medication. DO NOT take aspirin or blood thinners until directed by your doctor. FOLLOW-UP PHONE [facility] will be made by nursing staff   If you have any problems, call your doctor as needed. CALL YOUR DOCTOR IF   Bleeding is expected the first few days and should gradually clear.    Temperature of 10 1°F or above   Green or yellow discharge

## 2023-03-27 NOTE — ANESTHESIA PROCEDURE NOTES
Airway  Date/Time: 3/27/2023 8:25 AM  Urgency: elective    Airway not difficult    General Information and Staff    Patient location during procedure: OR  Performed: resident/CRNA     Indications and Patient Condition  Indications for airway management: anesthesia  Spontaneous Ventilation: absent  Sedation level: deep  Preoxygenated: yes  Patient position: sniffing  MILS not maintained throughout  Mask difficulty assessment: vent by bag mask    Final Airway Details  Final airway type: endotracheal airway      Successful airway: ETT  Cuffed: yes   Successful intubation technique: direct laryngoscopy  Facilitating devices/methods: intubating stylet  Endotracheal tube insertion site: oral  Blade: Rockwell  Blade size: #3  ETT size (mm): 7.0  Cormack-Lehane Classification: grade IIa - partial view of glottis  Placement verified by: chest auscultation and capnometry   Measured from: lips  Number of attempts at approach: 1  Ventilation between attempts: bag mask

## 2023-03-27 NOTE — ANESTHESIA PRE PROCEDURE
Applicable): No results found for: COVID19        Anesthesia Evaluation  Patient summary reviewed and Nursing notes reviewed history of anesthetic complications (Reports she had \"very high fever\" after prolonged jaw surgery that she said they treated quickly with medications? Unclear if MH as patient said she was out of it the whole time and no one else in the family was aware or has had MH reaction/been tested ):   Airway: Mallampati: III     Neck ROM: full  Comment: H/o UPPP - short chin and small mouth opening      Dental: normal exam         Pulmonary: breath sounds clear to auscultation  (+) asthma: seasonal asthma,                           ROS comment: H/o UPPP for jaw surgery    Cardiovascular:Negative CV ROS  Exercise tolerance: good (>4 METS),           Rhythm: regular  Rate: normal                    Neuro/Psych:   (+) seizures: well controlled, headaches:, psychiatric history:depression/anxiety             GI/Hepatic/Renal:   (+) GERD: well controlled,           Endo/Other:    (+) Diabetes (no meds currently ), . Abdominal:             Vascular: Other Findings:           Anesthesia Plan      TIVA and general     ASA 2     (GETA, Glidescope + non-triggering anesthetic. Unclear if patient has MH - reports she had a \"very high fever after jaw surgery\" in the recovery room that they reportedly corrected quickly with medication? She said she doesn't know much more than that and we do not have any outside records. She has never been tested and no one else in the family has had MH reaction or been tested. Out of abundance of caution, will assume she had MH reaction and will treat with non-triggering anesthetic )  Induction: intravenous. Anesthetic plan and risks discussed with patient.                         Fili Jones MD   3/27/2023

## 2023-03-27 NOTE — ANESTHESIA POSTPROCEDURE EVALUATION
Department of Anesthesiology  Postprocedure Note    Patient: Brynn Porter  MRN: [id number]  YOB: 1980  Date of evaluation: 3/27/2023      Procedure Summary     Date: 03/27/23 Room / Location: D OP OR 04 / [facility]    Anesthesia Start: 1763 Anesthesia Stop: 0923    Procedures:       SEPTOPLASTY (Nose)      BILATERAL SUBMUCOUS RESECTION INFERIOR TURBINATES (Bilateral: Nose)      SINUS ENDOSCOPY WITH BILATERAL MAXILLARY TISSUE REMOVAL USING BALLOONS (Bilateral: Nose) Diagnosis:       Other specified disorders of nose and nasal sinuses      Deviated nasal septum      Hypertrophy of nasal turbinates      Chronic maxillary sinusitis      Chronic ethmoidal sinusitis      (Other specified disorders of nose and nasal sinuses [J34.89])      (Deviated nasal septum [J34.2])      (Hypertrophy of nasal turbinates [J34.3])      (Chronic maxillary sinusitis [J32.0])      (Chronic ethmoidal sinusitis [J32.2])    Surgeons: Oliva Hagen DO Responsible Provider: Pete Smith MD    Anesthesia Type: TIVA, general ASA Status: 2          Anesthesia Type: No value filed. Alice Phase I: Alice Score: 7    Alice Phase II: Alice Score: 10      Anesthesia Post Evaluation    Patient location during evaluation: PACU  Patient participation: complete - patient participated  Level of consciousness: awake and alert  Airway patency: patent  Nausea: well controlled. Complications: no  Cardiovascular status: acceptable.   Respiratory status: acceptable  Hydration status: stable

## 2023-03-28 NOTE — OP NOTE
turbinates given the patient's widely patent nasal airway. Some blood was suctioned from the nose and nasopharynx. Now, a 0-degree scope was placed on each side of the nose. I was able to medialize the middle turbinates bilaterally. She had very posterior sitting uncinate processes. I was able to medialize these using a ball probe. I was able to feed the guidewire and the balloon into left maxillary sinus. The balloon was inflated to a pressure of 12 and deflated. The sinus was irrigated using 200 mL of saline. Reinspection revealed thickened mucosa just inside the sinus opening which was removed and the sinus appeared to be open and clear. On the right side, I was able to feed the guidewire and balloon into the right maxillary sinus. This was much more difficult as even the outflow tract was harder to follow even with a guidewire, but I was able to get this in. Sinus was irrigated using 200 mL of saline. Reinspection revealed thickened mucosa just inside the sinus opening which was removed and the sinus appeared to be open and clear. At that point, blood was suctioned from the nose and nasopharynx. Gallo splints with antibiotic ointment were placed on each side of the nose and sewn in place anteriorly using a single nylon stitch and then the patient was awakened and taken to the postop recovery room in stable condition.       DO JOSIANE [email or web address]  D:  03/27/2023 19:54  T:  03/28/2023 2:43  JOB #:  [id number]

## (undated) DEVICE — SPONGE,NEURO,1"X3",XR,STRL,LF,10/PK: Brand: MEDLINE

## (undated) DEVICE — SOCK SPEC L9IN WHT UNIV W/ STD PRT FOR FLD MGMT

## (undated) DEVICE — GLOVE ORANGE PI 8 1/2   MSG9085

## (undated) DEVICE — SYRINGE MED 50ML LUERLOCK TIP

## (undated) DEVICE — SOLUTION IRRIG 1000ML 0.9% SOD CHL USP POUR PLAS BTL

## (undated) DEVICE — DEVICE INFL PRSS G INDIC DISP (MUST BE PURC IN MULTIPLES OF 5)

## (undated) DEVICE — KIT,ANTI FOG,W/SPONGE & FLUID,SOFT PACK: Brand: MEDLINE

## (undated) DEVICE — SUTURE CHROMIC GUT SZ 4-0 L27IN ABSRB BRN L17MM RB-1 1/2 U203H

## (undated) DEVICE — KIT PROCEDURE SURG HEAD AND NECK TOTE

## (undated) DEVICE — SPLINT NSL SEPTAL SUPP REG PRE PUNCHED HOLE SIL STRL BRTH EZ

## (undated) DEVICE — TUBING, SUCTION, 1/4" X 10', STRAIGHT: Brand: MEDLINE

## (undated) DEVICE — SUTURE NONABSORBABLE MONOFILAMENT 3-0 PS-1 18 IN BLK ETHILON 1663H

## (undated) DEVICE — GARMENT,MEDLINE,DVT,INT,CALF,MED, GEN2: Brand: MEDLINE